# Patient Record
Sex: MALE | Race: BLACK OR AFRICAN AMERICAN | NOT HISPANIC OR LATINO | Employment: OTHER | URBAN - METROPOLITAN AREA
[De-identification: names, ages, dates, MRNs, and addresses within clinical notes are randomized per-mention and may not be internally consistent; named-entity substitution may affect disease eponyms.]

---

## 2022-02-15 ENCOUNTER — HOSPITAL ENCOUNTER (INPATIENT)
Facility: HOSPITAL | Age: 82
LOS: 1 days | Discharge: HOME HEALTH | DRG: 203 | End: 2022-02-18
Attending: EMERGENCY MEDICINE | Admitting: INTERNAL MEDICINE
Payer: MEDICARE

## 2022-02-15 ENCOUNTER — HOSPITAL ENCOUNTER (EMERGENCY)
Facility: HOSPITAL | Age: 82
Discharge: HOME | End: 2022-02-15
Attending: SURGERY
Payer: MEDICARE

## 2022-02-15 VITALS
DIASTOLIC BLOOD PRESSURE: 83 MMHG | OXYGEN SATURATION: 100 % | HEART RATE: 113 BPM | SYSTOLIC BLOOD PRESSURE: 158 MMHG | WEIGHT: 150 LBS | RESPIRATION RATE: 24 BRPM | TEMPERATURE: 96 F

## 2022-02-15 DIAGNOSIS — K22.2 ESOPHAGEAL OBSTRUCTION: ICD-10-CM

## 2022-02-15 DIAGNOSIS — T18.108A ESOPHAGEAL FOREIGN BODY: ICD-10-CM

## 2022-02-15 DIAGNOSIS — R07.9 CHEST PAIN: ICD-10-CM

## 2022-02-15 DIAGNOSIS — J39.8 TRACHEAL STENOSIS: Primary | ICD-10-CM

## 2022-02-15 PROBLEM — Z93.0 TRACHEOSTOMY IN PLACE: Status: ACTIVE | Noted: 2022-02-15

## 2022-02-15 PROBLEM — T18.128A FOOD IMPACTION OF ESOPHAGUS: Status: ACTIVE | Noted: 2022-02-15
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
## 2022-02-15 PROBLEM — C32.9 SQUAMOUS CELL CARCINOMA OF LARYNX: Status: ACTIVE | Noted: 2022-02-15

## 2022-02-15 PROBLEM — Z90.02 STATUS POST LARYNGECTOMY: Status: ACTIVE | Noted: 2022-02-15

## 2022-02-15 LAB
ALBUMIN SERPL BCP-MCNC: 4.3 G/DL (ref 3.5–5.2)
ALLENS TEST: ABNORMAL
ALP SERPL-CCNC: 109 U/L (ref 55–135)
ALT SERPL W/O P-5'-P-CCNC: 25 U/L (ref 10–44)
ANION GAP SERPL CALC-SCNC: 13 MMOL/L (ref 8–16)
AST SERPL-CCNC: 35 U/L (ref 10–40)
BASOPHILS # BLD AUTO: 0.06 K/UL (ref 0–0.2)
BASOPHILS NFR BLD: 0.9 % (ref 0–1.9)
BILIRUB SERPL-MCNC: 0.8 MG/DL (ref 0.1–1)
BUN SERPL-MCNC: 10 MG/DL (ref 8–23)
CALCIUM SERPL-MCNC: 10.9 MG/DL (ref 8.7–10.5)
CHLORIDE SERPL-SCNC: 99 MMOL/L (ref 95–110)
CK MB SERPL-MCNC: 5 NG/ML (ref 0.1–6.5)
CK MB SERPL-RTO: 6.1 % (ref 0–5)
CK SERPL-CCNC: 82 U/L (ref 20–200)
CK SERPL-CCNC: 82 U/L (ref 20–200)
CO2 SERPL-SCNC: 27 MMOL/L (ref 23–29)
CREAT SERPL-MCNC: 0.8 MG/DL (ref 0.5–1.4)
DELSYS: ABNORMAL
DIFFERENTIAL METHOD: ABNORMAL
EOSINOPHIL # BLD AUTO: 0 K/UL (ref 0–0.5)
EOSINOPHIL NFR BLD: 0.1 % (ref 0–8)
ERYTHROCYTE [DISTWIDTH] IN BLOOD BY AUTOMATED COUNT: 14 % (ref 11.5–14.5)
EST. GFR  (AFRICAN AMERICAN): >60 ML/MIN/1.73 M^2
EST. GFR  (NON AFRICAN AMERICAN): >60 ML/MIN/1.73 M^2
GLUCOSE SERPL-MCNC: 124 MG/DL (ref 70–110)
HCO3 UR-SCNC: 24.1 MMOL/L (ref 24–28)
HCT VFR BLD AUTO: 51.9 % (ref 40–54)
HGB BLD-MCNC: 17 G/DL (ref 14–18)
IMM GRANULOCYTES # BLD AUTO: 0.02 K/UL (ref 0–0.04)
IMM GRANULOCYTES NFR BLD AUTO: 0.3 % (ref 0–0.5)
LYMPHOCYTES # BLD AUTO: 1.1 K/UL (ref 1–4.8)
LYMPHOCYTES NFR BLD: 15.6 % (ref 18–48)
MCH RBC QN AUTO: 33.3 PG (ref 27–31)
MCHC RBC AUTO-ENTMCNC: 32.8 G/DL (ref 32–36)
MCV RBC AUTO: 102 FL (ref 82–98)
MODE: ABNORMAL
MONOCYTES # BLD AUTO: 0.5 K/UL (ref 0.3–1)
MONOCYTES NFR BLD: 6.7 % (ref 4–15)
NEUTROPHILS # BLD AUTO: 5.3 K/UL (ref 1.8–7.7)
NEUTROPHILS NFR BLD: 76.4 % (ref 38–73)
NRBC BLD-RTO: 0 /100 WBC
PCO2 BLDA: 42.4 MMHG (ref 35–45)
PH SMN: 7.36 [PH] (ref 7.35–7.45)
PLATELET # BLD AUTO: 217 K/UL (ref 150–450)
PMV BLD AUTO: 10.9 FL (ref 9.2–12.9)
PO2 BLDA: 30 MMHG (ref 40–60)
POC BE: -1 MMOL/L
POC SATURATED O2: 55 % (ref 95–100)
POC TCO2: 25 MMOL/L (ref 24–29)
POTASSIUM SERPL-SCNC: 4.2 MMOL/L (ref 3.5–5.1)
PROT SERPL-MCNC: 10.2 G/DL (ref 6–8.4)
RBC # BLD AUTO: 5.11 M/UL (ref 4.6–6.2)
SAMPLE: ABNORMAL
SARS-COV-2 RDRP RESP QL NAA+PROBE: NEGATIVE
SITE: ABNORMAL
SODIUM SERPL-SCNC: 139 MMOL/L (ref 136–145)
TROPONIN I SERPL DL<=0.01 NG/ML-MCNC: 0.02 NG/ML (ref 0–0.03)
WBC # BLD AUTO: 6.87 K/UL (ref 3.9–12.7)

## 2022-02-15 PROCEDURE — G0378 HOSPITAL OBSERVATION PER HR: HCPCS

## 2022-02-15 PROCEDURE — 80053 COMPREHEN METABOLIC PANEL: CPT | Performed by: SURGERY

## 2022-02-15 PROCEDURE — 96375 TX/PRO/DX INJ NEW DRUG ADDON: CPT

## 2022-02-15 PROCEDURE — 82803 BLOOD GASES ANY COMBINATION: CPT

## 2022-02-15 PROCEDURE — 99291 CRITICAL CARE FIRST HOUR: CPT | Mod: 25

## 2022-02-15 PROCEDURE — 36415 COLL VENOUS BLD VENIPUNCTURE: CPT | Performed by: SURGERY

## 2022-02-15 PROCEDURE — 96374 THER/PROPH/DIAG INJ IV PUSH: CPT

## 2022-02-15 PROCEDURE — 99900035 HC TECH TIME PER 15 MIN (STAT)

## 2022-02-15 PROCEDURE — 93010 ELECTROCARDIOGRAM REPORT: CPT | Mod: ,,, | Performed by: INTERNAL MEDICINE

## 2022-02-15 PROCEDURE — 63600175 PHARM REV CODE 636 W HCPCS: Performed by: SURGERY

## 2022-02-15 PROCEDURE — 27000221 HC OXYGEN, UP TO 24 HOURS

## 2022-02-15 PROCEDURE — U0002 COVID-19 LAB TEST NON-CDC: HCPCS | Performed by: SURGERY

## 2022-02-15 PROCEDURE — 99204 OFFICE O/P NEW MOD 45 MIN: CPT | Mod: 25,GC,, | Performed by: STUDENT IN AN ORGANIZED HEALTH CARE EDUCATION/TRAINING PROGRAM

## 2022-02-15 PROCEDURE — 99285 PR EMERGENCY DEPT VISIT,LEVEL V: ICD-10-PCS | Mod: GC,,, | Performed by: EMERGENCY MEDICINE

## 2022-02-15 PROCEDURE — 99220 PR INITIAL OBSERVATION CARE,LEVL III: ICD-10-PCS | Mod: ,,, | Performed by: PHYSICIAN ASSISTANT

## 2022-02-15 PROCEDURE — 82553 CREATINE MB FRACTION: CPT | Performed by: SURGERY

## 2022-02-15 PROCEDURE — 93005 ELECTROCARDIOGRAM TRACING: CPT

## 2022-02-15 PROCEDURE — 94640 AIRWAY INHALATION TREATMENT: CPT

## 2022-02-15 PROCEDURE — 99285 EMERGENCY DEPT VISIT HI MDM: CPT | Mod: GC,,, | Performed by: EMERGENCY MEDICINE

## 2022-02-15 PROCEDURE — 84484 ASSAY OF TROPONIN QUANT: CPT | Performed by: SURGERY

## 2022-02-15 PROCEDURE — 85025 COMPLETE CBC W/AUTO DIFF WBC: CPT | Performed by: SURGERY

## 2022-02-15 PROCEDURE — 93010 EKG 12-LEAD: ICD-10-PCS | Mod: ,,, | Performed by: INTERNAL MEDICINE

## 2022-02-15 PROCEDURE — 25000242 PHARM REV CODE 250 ALT 637 W/ HCPCS: Performed by: SURGERY

## 2022-02-15 PROCEDURE — 99220 PR INITIAL OBSERVATION CARE,LEVL III: CPT | Mod: ,,, | Performed by: PHYSICIAN ASSISTANT

## 2022-02-15 PROCEDURE — 99204 PR OFFICE/OUTPT VISIT, NEW, LEVL IV, 45-59 MIN: ICD-10-PCS | Mod: 25,GC,, | Performed by: STUDENT IN AN ORGANIZED HEALTH CARE EDUCATION/TRAINING PROGRAM

## 2022-02-15 RX ORDER — GLUCAGON 1 MG
2 KIT INJECTION
Status: COMPLETED | OUTPATIENT
Start: 2022-02-15 | End: 2022-02-15

## 2022-02-15 RX ORDER — ONDANSETRON 8 MG/1
8 TABLET, ORALLY DISINTEGRATING ORAL EVERY 8 HOURS PRN
Status: DISCONTINUED | OUTPATIENT
Start: 2022-02-15 | End: 2022-02-19 | Stop reason: HOSPADM

## 2022-02-15 RX ORDER — TALC
6 POWDER (GRAM) TOPICAL NIGHTLY PRN
Status: DISCONTINUED | OUTPATIENT
Start: 2022-02-15 | End: 2022-02-16

## 2022-02-15 RX ORDER — IPRATROPIUM BROMIDE AND ALBUTEROL SULFATE 2.5; .5 MG/3ML; MG/3ML
3 SOLUTION RESPIRATORY (INHALATION)
Status: COMPLETED | OUTPATIENT
Start: 2022-02-15 | End: 2022-02-15

## 2022-02-15 RX ORDER — MAG HYDROX/ALUMINUM HYD/SIMETH 200-200-20
30 SUSPENSION, ORAL (FINAL DOSE FORM) ORAL 4 TIMES DAILY PRN
Status: DISCONTINUED | OUTPATIENT
Start: 2022-02-15 | End: 2022-02-19 | Stop reason: HOSPADM

## 2022-02-15 RX ORDER — NALOXONE HCL 0.4 MG/ML
0.02 VIAL (ML) INJECTION
Status: DISCONTINUED | OUTPATIENT
Start: 2022-02-15 | End: 2022-02-19 | Stop reason: HOSPADM

## 2022-02-15 RX ORDER — SIMETHICONE 80 MG
1 TABLET,CHEWABLE ORAL 4 TIMES DAILY PRN
Status: DISCONTINUED | OUTPATIENT
Start: 2022-02-15 | End: 2022-02-19 | Stop reason: HOSPADM

## 2022-02-15 RX ORDER — IBUPROFEN 200 MG
24 TABLET ORAL
Status: DISCONTINUED | OUTPATIENT
Start: 2022-02-15 | End: 2022-02-19 | Stop reason: HOSPADM

## 2022-02-15 RX ORDER — PROCHLORPERAZINE EDISYLATE 5 MG/ML
5 INJECTION INTRAMUSCULAR; INTRAVENOUS EVERY 6 HOURS PRN
Status: DISCONTINUED | OUTPATIENT
Start: 2022-02-15 | End: 2022-02-19 | Stop reason: HOSPADM

## 2022-02-15 RX ORDER — IPRATROPIUM BROMIDE AND ALBUTEROL SULFATE 2.5; .5 MG/3ML; MG/3ML
3 SOLUTION RESPIRATORY (INHALATION) EVERY 4 HOURS PRN
Status: DISCONTINUED | OUTPATIENT
Start: 2022-02-15 | End: 2022-02-19 | Stop reason: HOSPADM

## 2022-02-15 RX ORDER — GLUCAGON 1 MG
KIT INJECTION
Status: COMPLETED
Start: 2022-02-15 | End: 2022-02-15

## 2022-02-15 RX ORDER — IBUPROFEN 200 MG
16 TABLET ORAL
Status: DISCONTINUED | OUTPATIENT
Start: 2022-02-15 | End: 2022-02-19 | Stop reason: HOSPADM

## 2022-02-15 RX ORDER — HYDRALAZINE HYDROCHLORIDE 20 MG/ML
20 INJECTION INTRAMUSCULAR; INTRAVENOUS
Status: COMPLETED | OUTPATIENT
Start: 2022-02-15 | End: 2022-02-15

## 2022-02-15 RX ORDER — IPRATROPIUM BROMIDE AND ALBUTEROL SULFATE 2.5; .5 MG/3ML; MG/3ML
SOLUTION RESPIRATORY (INHALATION)
Status: DISCONTINUED
Start: 2022-02-15 | End: 2022-02-15 | Stop reason: HOSPADM

## 2022-02-15 RX ORDER — GLUCAGON 1 MG
1 KIT INJECTION
Status: DISCONTINUED | OUTPATIENT
Start: 2022-02-15 | End: 2022-02-19 | Stop reason: HOSPADM

## 2022-02-15 RX ORDER — ENOXAPARIN SODIUM 100 MG/ML
40 INJECTION SUBCUTANEOUS EVERY 24 HOURS
Status: DISCONTINUED | OUTPATIENT
Start: 2022-02-16 | End: 2022-02-19 | Stop reason: HOSPADM

## 2022-02-15 RX ORDER — POLYETHYLENE GLYCOL 3350 17 G/17G
17 POWDER, FOR SOLUTION ORAL DAILY PRN
Status: DISCONTINUED | OUTPATIENT
Start: 2022-02-15 | End: 2022-02-19 | Stop reason: HOSPADM

## 2022-02-15 RX ORDER — ACETAMINOPHEN 325 MG/1
650 TABLET ORAL EVERY 4 HOURS PRN
Status: DISCONTINUED | OUTPATIENT
Start: 2022-02-15 | End: 2022-02-19 | Stop reason: HOSPADM

## 2022-02-15 RX ORDER — SODIUM CHLORIDE 0.9 % (FLUSH) 0.9 %
10 SYRINGE (ML) INJECTION EVERY 8 HOURS PRN
Status: DISCONTINUED | OUTPATIENT
Start: 2022-02-15 | End: 2022-02-19 | Stop reason: HOSPADM

## 2022-02-15 RX ADMIN — GLUCAGON 2 MG: KIT at 11:02

## 2022-02-15 RX ADMIN — HYDRALAZINE HYDROCHLORIDE 20 MG: 20 INJECTION INTRAMUSCULAR; INTRAVENOUS at 11:02

## 2022-02-15 RX ADMIN — IPRATROPIUM BROMIDE AND ALBUTEROL SULFATE 3 ML: 2.5; .5 SOLUTION RESPIRATORY (INHALATION) at 05:02

## 2022-02-15 NOTE — ED PROVIDER NOTES
[facility]                                                 I reviewed the ER triage nurse's note before evaluating the patient    Chief Complaint  81 y.o. male with Foreign Body   -- Patient to ED per [facility] with c/o eating pork chops last night  -- Feeling like it is stuck in his throat  -- No respiratory difficulty noted    History of Present Illness  Silvio Mayorga presents to the emergency room with esophageal foreign body  Patient ate pork chop last night with reported this morning per ER interview  Patient did not pass a water swallowing test in the emergency room this a.m.  Patient has no history of esophageal food boluses, stable on ER presentation    The history is provided by the patient  Previous medical records were obtained from [facility]  Previous records are summarized from prior ER visits and hospitalizations  History reviewed. No pertinent past medical history.  History reviewed. No pertinent surgical history.   No Known Allergies   No significant family history  No significant social history, nonsmoker    I have reviewed all of this patient's past medical, surgical, family, and social   histories as well as active allergies and medications documented in the  electronic medical record    Review of Systems and Physical Exam      Review of Systems (all other ROS are otherwise negative)  -- Constitution - no fever, denies fatigue, no weakness, no chills, night sweats  -- Eyes - no tearing or redness, no visual disturbance  -- Ear, Nose - no tinnitus or earache, no nasal congestion or discharge  -- Mouth,Throat - no sore throat, no toothache, normal voice, normal swallowing  -- Respiratory - denies cough and congestion, no shortness of breath, no LOO  -- Cardiovascular - denies chest pain, no palpitations, denies claudication  -- Gastrointestinal - feels like he has a impacted food bolus in his esophagus  -- Genitourinary - no dysuria, no hematuria, no flank pain, no  bladder pain  -- Musculoskeletal - denies back pain, negative for trauma or injury  -- Neurological - no headache, no numbness, tingling, seizure, balance issues  -- Hematologic- no bruising, no bleeding, nose bleed, bleeding disorders    Vital Signs (reviewed by the physician)  His oral temperature is 96.4 °F (35.8 °C).   His blood pressure is 133/63 and his pulse is 103.   His respiration is 20 and oxygen saturation is 100%.     Physical Exam  -- Nursing note and vitals reviewed  -- Constitutional: Appears well-developed and well-nourished  -- Head: Atraumatic. Normocephalic. No obvious abnormality  -- Eyes: Pupils are equal and reactive to light. Normal conjunctiva and lids  -- Nose: Nose normal in appearance, nares grossly normal. No discharge  -- Throat: Mucous membranes moist, pharynx normal, normal tonsils. No lesions   -- Ears: External ears and TM normal bilaterally. Normal hearing and no drainage  -- Neck: Tracheostomy is clean dry and intact  -- Cardiac: Normal rate, regular rhythm and normal heart sounds  -- Respiratory: Normal respiratory effort, breath sounds clear to auscultation  -- Gastrointestinal: Soft, no tenderness. Normal bowel sounds. Normal liver edge  -- Musculoskeletal: Normal range of motion, no effusions. Joints stable   -- Neurological: No focal deficits. Showed good interaction with staff  -- Vascular: Posterior tibial, dorsalis pedis and radial pulses 2+ bilaterally      Emergency Room Course      Lab Results (reviewed by the physician)     K 4.2   CL 99   CO2 27   BUN 10   CREATININE 0.8    (H)   ALKPHOS 109   AST 35   ALT 25   BILITOT 0.8   ALBUMIN 4.3   PROT 10.2 (H)   WBC 6.87   HGB 17.0   HCT 51.9      CPK 82   CPK 82   CPKMB 5.0   TROPONINI 0.021     EKG (interpreted by the physician)  Overall Interpretation: normal rate and rhythm with no obvious ischemic changes  Normal sinus rhythm @ 103 bpm  No ST elevation or depression  No arrhythmia or QRS change  noted  Similar when compared to previous EKG    Chest x-ray (images visualized & reports reviewed by the physician)  The lungs are well expanded with coarse interstitial markings throughout.    Questionable reticular nodularity in the right lung apex which could represent a   superimposed acute inflammatory/infectious process.  Heart size is normal.    Calcified atheromatous disease affects the aorta. Age-appropriate degenerative   changes affect the skeleton.     Additional Work up (reviewed by the physician)  -- rapid Coronavirus PCR was negative    Medications Given  hydrALAZINE injection 20 mg (20 mg Intravenous Given 2/15/22 1153)   glucagon (human recombinant) injection 2 mg     Critical Care ED Physician Time (minutes):  -- Performed by: Jared Perkins M.D.  -- Date/Time: 1:22 PM 2/15/2022   -- Direct Patient Care (Face Time): 5  -- Additional History from Records or Taking Additional History: 5  -- Ordering, Reviewing, and Interpreting Diagnostic Studies: 5  -- Total Time in Documentation: 11  -- Consultation with Other Physicians: 5  -- Consultation with Family Related to Condition: 0  -- Total Critical Care Time: 31  -- Critical care was necessary to treat severe hypertension treated with IV hydralazine  -- Critical care was time spent personally by me on the following activities:   -- blood draw for specimens discussions with consultants,   -- development of treatment plan with patient or surrogate,   -- examination of patient, ordering and performing treatments   -- review of radiographic studies, re-evaluation of pt's condition  -- review of labs and evaluation of response to treatment    Medical Decision Making     ED Course/ED Management  -- patient presents with trouble swallowing after eating pork chops last night  -- likely esophageal food bolus impaction, glucagon did not help the issue  -- will transfer for Gastroenterology evaluation, stable at this time  -- has not been to the doctor in several  years with significant hypertension treated   -- appropriate in stable at this time, agrees to transfer to higher level care    Assessment, Disposition, & Plan      Diagnosis  [T18.108A] Esophageal foreign body    Disposition and Plan  -- Disposition: transfer  -- Condition: stable    This note is dictated on M*Modal word recognition program.  There are word recognition mistakes that are occasionally missed on review.         Jared Perkins MD  02/15/22 9622

## 2022-02-16 ENCOUNTER — ANESTHESIA (OUTPATIENT)
Dept: ENDOSCOPY | Facility: HOSPITAL | Age: 82
DRG: 203 | End: 2022-02-16
Payer: MEDICARE

## 2022-02-16 ENCOUNTER — ANESTHESIA EVENT (OUTPATIENT)
Dept: ENDOSCOPY | Facility: HOSPITAL | Age: 82
DRG: 203 | End: 2022-02-16
Payer: MEDICARE

## 2022-02-16 PROBLEM — Z93.0 TRACHEOSTOMY IN PLACE: Status: RESOLVED | Noted: 2022-02-15 | Resolved: 2022-02-16

## 2022-02-16 PROBLEM — F10.10 ALCOHOL ABUSE: Status: ACTIVE | Noted: 2022-02-16

## 2022-02-16 LAB
ANION GAP SERPL CALC-SCNC: 16 MMOL/L (ref 8–16)
BASOPHILS # BLD AUTO: 0.05 K/UL (ref 0–0.2)
BASOPHILS NFR BLD: 0.7 % (ref 0–1.9)
BUN SERPL-MCNC: 14 MG/DL (ref 8–23)
CALCIUM SERPL-MCNC: 10.3 MG/DL (ref 8.7–10.5)
CHLORIDE SERPL-SCNC: 102 MMOL/L (ref 95–110)
CO2 SERPL-SCNC: 21 MMOL/L (ref 23–29)
CREAT SERPL-MCNC: 0.8 MG/DL (ref 0.5–1.4)
DIFFERENTIAL METHOD: ABNORMAL
EOSINOPHIL # BLD AUTO: 0 K/UL (ref 0–0.5)
EOSINOPHIL NFR BLD: 0 % (ref 0–8)
ERYTHROCYTE [DISTWIDTH] IN BLOOD BY AUTOMATED COUNT: 14.3 % (ref 11.5–14.5)
EST. GFR  (AFRICAN AMERICAN): >60 ML/MIN/1.73 M^2
EST. GFR  (NON AFRICAN AMERICAN): >60 ML/MIN/1.73 M^2
GLUCOSE SERPL-MCNC: 100 MG/DL (ref 70–110)
HCT VFR BLD AUTO: 47.6 % (ref 40–54)
HGB BLD-MCNC: 15.3 G/DL (ref 14–18)
IMM GRANULOCYTES # BLD AUTO: 0.02 K/UL (ref 0–0.04)
IMM GRANULOCYTES NFR BLD AUTO: 0.3 % (ref 0–0.5)
LYMPHOCYTES # BLD AUTO: 1.3 K/UL (ref 1–4.8)
LYMPHOCYTES NFR BLD: 17.1 % (ref 18–48)
MCH RBC QN AUTO: 33.3 PG (ref 27–31)
MCHC RBC AUTO-ENTMCNC: 32.1 G/DL (ref 32–36)
MCV RBC AUTO: 104 FL (ref 82–98)
MONOCYTES # BLD AUTO: 0.6 K/UL (ref 0.3–1)
MONOCYTES NFR BLD: 7.5 % (ref 4–15)
NEUTROPHILS # BLD AUTO: 5.7 K/UL (ref 1.8–7.7)
NEUTROPHILS NFR BLD: 74.4 % (ref 38–73)
NRBC BLD-RTO: 0 /100 WBC
PLATELET # BLD AUTO: 210 K/UL (ref 150–450)
PMV BLD AUTO: 10.4 FL (ref 9.2–12.9)
POTASSIUM SERPL-SCNC: 5 MMOL/L (ref 3.5–5.1)
RBC # BLD AUTO: 4.6 M/UL (ref 4.6–6.2)
SODIUM SERPL-SCNC: 139 MMOL/L (ref 136–145)
WBC # BLD AUTO: 7.62 K/UL (ref 3.9–12.7)

## 2022-02-16 PROCEDURE — G0378 HOSPITAL OBSERVATION PER HR: HCPCS

## 2022-02-16 PROCEDURE — 63600175 PHARM REV CODE 636 W HCPCS: Performed by: PHYSICIAN ASSISTANT

## 2022-02-16 PROCEDURE — 25000003 PHARM REV CODE 250: Performed by: PHYSICIAN ASSISTANT

## 2022-02-16 PROCEDURE — 94761 N-INVAS EAR/PLS OXIMETRY MLT: CPT

## 2022-02-16 PROCEDURE — 96375 TX/PRO/DX INJ NEW DRUG ADDON: CPT | Performed by: EMERGENCY MEDICINE

## 2022-02-16 PROCEDURE — 96374 THER/PROPH/DIAG INJ IV PUSH: CPT | Performed by: EMERGENCY MEDICINE

## 2022-02-16 PROCEDURE — 99226 PR SUBSEQUENT OBSERVATION CARE,LEVEL III: ICD-10-PCS | Mod: ,,, | Performed by: PHYSICIAN ASSISTANT

## 2022-02-16 PROCEDURE — 43235 EGD DIAGNOSTIC BRUSH WASH: CPT | Mod: GC,,, | Performed by: STUDENT IN AN ORGANIZED HEALTH CARE EDUCATION/TRAINING PROGRAM

## 2022-02-16 PROCEDURE — 27000221 HC OXYGEN, UP TO 24 HOURS

## 2022-02-16 PROCEDURE — 99900035 HC TECH TIME PER 15 MIN (STAT)

## 2022-02-16 PROCEDURE — 63600175 PHARM REV CODE 636 W HCPCS: Performed by: NURSE ANESTHETIST, CERTIFIED REGISTERED

## 2022-02-16 PROCEDURE — 63600175 PHARM REV CODE 636 W HCPCS: Performed by: INTERNAL MEDICINE

## 2022-02-16 PROCEDURE — D9220A PRA ANESTHESIA: Mod: ANES,,, | Performed by: ANESTHESIOLOGY

## 2022-02-16 PROCEDURE — 25000003 PHARM REV CODE 250: Performed by: NURSE ANESTHETIST, CERTIFIED REGISTERED

## 2022-02-16 PROCEDURE — 96372 THER/PROPH/DIAG INJ SC/IM: CPT | Performed by: PHYSICIAN ASSISTANT

## 2022-02-16 PROCEDURE — D9220A PRA ANESTHESIA: ICD-10-PCS | Mod: ANES,,, | Performed by: ANESTHESIOLOGY

## 2022-02-16 PROCEDURE — 43235 PR EGD, FLEX, DIAGNOSTIC: ICD-10-PCS | Mod: GC,,, | Performed by: STUDENT IN AN ORGANIZED HEALTH CARE EDUCATION/TRAINING PROGRAM

## 2022-02-16 PROCEDURE — D9220A PRA ANESTHESIA: ICD-10-PCS | Mod: CRNA,,, | Performed by: NURSE ANESTHETIST, CERTIFIED REGISTERED

## 2022-02-16 PROCEDURE — 96361 HYDRATE IV INFUSION ADD-ON: CPT | Performed by: EMERGENCY MEDICINE

## 2022-02-16 PROCEDURE — 37000008 HC ANESTHESIA 1ST 15 MINUTES: Performed by: STUDENT IN AN ORGANIZED HEALTH CARE EDUCATION/TRAINING PROGRAM

## 2022-02-16 PROCEDURE — 80048 BASIC METABOLIC PNL TOTAL CA: CPT | Performed by: PHYSICIAN ASSISTANT

## 2022-02-16 PROCEDURE — 37000009 HC ANESTHESIA EA ADD 15 MINS: Performed by: STUDENT IN AN ORGANIZED HEALTH CARE EDUCATION/TRAINING PROGRAM

## 2022-02-16 PROCEDURE — 99226 PR SUBSEQUENT OBSERVATION CARE,LEVEL III: CPT | Mod: ,,, | Performed by: PHYSICIAN ASSISTANT

## 2022-02-16 PROCEDURE — 43235 EGD DIAGNOSTIC BRUSH WASH: CPT | Performed by: STUDENT IN AN ORGANIZED HEALTH CARE EDUCATION/TRAINING PROGRAM

## 2022-02-16 PROCEDURE — D9220A PRA ANESTHESIA: Mod: CRNA,,, | Performed by: NURSE ANESTHETIST, CERTIFIED REGISTERED

## 2022-02-16 PROCEDURE — 85025 COMPLETE CBC W/AUTO DIFF WBC: CPT | Performed by: PHYSICIAN ASSISTANT

## 2022-02-16 RX ORDER — PROPOFOL 10 MG/ML
VIAL (ML) INTRAVENOUS
Status: DISCONTINUED | OUTPATIENT
Start: 2022-02-16 | End: 2022-02-16

## 2022-02-16 RX ORDER — HYDRALAZINE HYDROCHLORIDE 20 MG/ML
10 INJECTION INTRAMUSCULAR; INTRAVENOUS ONCE
Status: COMPLETED | OUTPATIENT
Start: 2022-02-16 | End: 2022-02-16

## 2022-02-16 RX ORDER — LORAZEPAM 2 MG/ML
0.5 INJECTION INTRAMUSCULAR ONCE AS NEEDED
Status: COMPLETED | OUTPATIENT
Start: 2022-02-16 | End: 2022-02-16

## 2022-02-16 RX ORDER — PROPOFOL 10 MG/ML
VIAL (ML) INTRAVENOUS CONTINUOUS PRN
Status: DISCONTINUED | OUTPATIENT
Start: 2022-02-16 | End: 2022-02-16

## 2022-02-16 RX ORDER — SODIUM CHLORIDE 0.9 % (FLUSH) 0.9 %
10 SYRINGE (ML) INJECTION
Status: DISCONTINUED | OUTPATIENT
Start: 2022-02-16 | End: 2022-02-19 | Stop reason: HOSPADM

## 2022-02-16 RX ORDER — TALC
6 POWDER (GRAM) TOPICAL NIGHTLY
Status: DISCONTINUED | OUTPATIENT
Start: 2022-02-16 | End: 2022-02-19 | Stop reason: HOSPADM

## 2022-02-16 RX ORDER — LIDOCAINE HYDROCHLORIDE 20 MG/ML
INJECTION INTRAVENOUS
Status: DISCONTINUED | OUTPATIENT
Start: 2022-02-16 | End: 2022-02-16

## 2022-02-16 RX ORDER — HYDRALAZINE HYDROCHLORIDE 20 MG/ML
INJECTION INTRAMUSCULAR; INTRAVENOUS
Status: DISPENSED
Start: 2022-02-16 | End: 2022-02-17

## 2022-02-16 RX ADMIN — SODIUM CHLORIDE: 0.9 INJECTION, SOLUTION INTRAVENOUS at 09:02

## 2022-02-16 RX ADMIN — LORAZEPAM 0.5 MG: 2 INJECTION INTRAMUSCULAR; INTRAVENOUS at 05:02

## 2022-02-16 RX ADMIN — SODIUM CHLORIDE, SODIUM LACTATE, POTASSIUM CHLORIDE, AND CALCIUM CHLORIDE 500 ML: .6; .31; .03; .02 INJECTION, SOLUTION INTRAVENOUS at 04:02

## 2022-02-16 RX ADMIN — ENOXAPARIN SODIUM 40 MG: 100 INJECTION SUBCUTANEOUS at 04:02

## 2022-02-16 RX ADMIN — LIDOCAINE HYDROCHLORIDE 100 MG: 20 INJECTION, SOLUTION INTRAVENOUS at 09:02

## 2022-02-16 RX ADMIN — Medication 40 MG: at 09:02

## 2022-02-16 RX ADMIN — HYDRALAZINE HYDROCHLORIDE 10 MG: 20 INJECTION INTRAMUSCULAR; INTRAVENOUS at 12:02

## 2022-02-16 RX ADMIN — Medication 6 MG: at 12:02

## 2022-02-16 RX ADMIN — PROPOFOL 150 MCG/KG/MIN: 10 INJECTION, EMULSION INTRAVENOUS at 09:02

## 2022-02-16 NOTE — HOSPITAL COURSE
81 y.o male admitted for GI evaluation for possible food impaction. Maintained airway and tolerated secretions and water. EGD done 12/16 showing no food bolus or evidence of recent impaction seen in the entire esophagus. Gastritis present and normal examined duodenum. Was cleared by GI for discharge home on chopped-up diet but had frequent oxygen saturations in 70-80's. Improved to 90s with 35% trach collar. RT evaluated, difficulty with suctioning tracheostomy site. ENT was consulted and found dry crust occupying about 90% of the stoma, which was cleared with improvement of his respiratory status. Patient to discharge home with HH services when medically stable. Pt also given ambulatory ENT and primary care referrals. SLP educated Pt on proper laryngectomy care. Pt educated on hospital course and plan, verbally agrees and understands. All questions answered.

## 2022-02-16 NOTE — ASSESSMENT & PLAN NOTE
81 y.o. male with history of history of laryngeal cancer s/p total laryngectomy (2008) and tracheostomy placement (no adjuvant therapy) who presents with feeling of food impaction in esophagus. Had food impaction approx 2 years ago that did not require EGD, he was able to cough it up.      Suspect obstruction of stoma causing symptoms, not food obstruction, see below   - in ED able to tolerate PO liquid and secretions. Maintaining airway  - s/p glucagon at outside ED  - GI consulted, appreciate recs  - EDG 2/16 without impacted food seen in the entirety of the esophagus. Gastritis was present, duodenum normal.   - ENT consulted. Patient still with FB sensation. Oxygen desaturations. Requiring trach collar with 35% O2 to maintain O2 saturation. Patient has history of removing trach collar.

## 2022-02-16 NOTE — PROVATION PATIENT INSTRUCTIONS
Discharge Summary/Instructions after an Endoscopic Procedure  Patient Name: Silvio Mayorga  Patient MRN: [id number]  Patient YOB: 1940 Wednesday, February 16, 2022  Heber Miller MD  Dear patient,  As a result of recent federal legislation (The Federal Cures Act), you may   receive lab or pathology results from your procedure in your [facility]   account before your physician is able to contact you. Your physician or   their representative will relay the results to you with their   recommendations at their soonest availability.  Thank you,  RESTRICTIONS:  During your procedure today, you received medications for sedation.  These   medications may affect your judgment, balance and coordination.  Therefore,   for 24 hours, you have the following restrictions:   - DO NOT drive a car, operate machinery, make legal/financial decisions,   sign important papers or drink alcohol.    ACTIVITY:  Today: no heavy lifting, straining or running due to procedural   sedation/anesthesia.  The following day: return to full activity including work.  DIET:  Eat and drink normally unless instructed otherwise.     TREATMENT FOR COMMON SIDE EFFECTS:  - Mild abdominal pain, nausea, belching, bloating or excessive gas:  rest,   eat lightly and use a heating pad.  - Sore Throat: treat with throat lozenges and/or gargle with warm salt   water.  - Because air was used during the procedure, expelling large amounts of air   from your rectum or belching is normal.  - If a bowel prep was taken, you may not have a bowel movement for 1-3 days.    This is normal.  SYMPTOMS TO WATCH FOR AND REPORT TO YOUR PHYSICIAN:  1. Abdominal pain or bloating, other than gas cramps.  2. Chest pain.  3. Back pain.  4. Signs of infection such as: chills or fever occurring within 24 hours   after the procedure.  5. Rectal bleeding, which would show as bright red, maroon, or black stools.   (A tablespoon of blood from the rectum is not serious,  especially if   hemorrhoids are present.)  6. Vomiting.  7. Weakness or dizziness.  GO DIRECTLY TO THE NEAREST EMERGENCY ROOM IF YOU HAVE ANY OF THE FOLLOWING:      Difficulty breathing              Chills and/or fever over 101 F   Persistent vomiting and/or vomiting blood   Severe abdominal pain   Severe chest pain   Black, tarry stools   Bleeding- more than one tablespoon   Any other symptom or condition that you feel may need urgent attention  Your doctor recommends these additional instructions:  If any biopsies were taken, your doctors clinic will contact you in 1 to 2   weeks with any results.  - Discharge patient to home.   - Chopped diet.   - Continue present medications.   - The findings and recommendations were discussed with the patient's   family.  For questions, problems or results please call your physician - Heber Miller MD at Work:  [phone].  [facility], EMERGENCY ROOM PHONE NUMBER: [phone]  IF A COMPLICATION OR EMERGENCY SITUATION ARISES AND YOU ARE UNABLE TO REACH   YOUR PHYSICIAN - GO DIRECTLY TO THE EMERGENCY ROOM.  Heber Miller MD  2/16/2022 9:25:21 AM  This report has been verified and signed electronically.  Dear patient,  As a result of recent federal legislation (The Federal Cures Act), you may   receive lab or pathology results from your procedure in your [facility]   account before your physician is able to contact you. Your physician or   their representative will relay the results to you with their   recommendations at their soonest availability.  Thank you,  PROVATION

## 2022-02-16 NOTE — ED PROVIDER NOTES
Encounter Date: 2/15/2022       History     Chief Complaint   Patient presents with    food bolus      Pt reports to ED via EMS from [facility] transfer  food bolus in esophagus, pt has old tracheostomy, 35% trach collar, 100%, Glucagon given,. Per EMS prior to leaving facility audible stridor present after breathing treatment. Pt aaox4      81-year-old male with history of food boluses, status post laryngectomy presents via EMS as transfer some from [facility] for Gastroenterology eval for food bolus after eating pork chops last night.  Patient denies any difficulty handling his secretions. Patient does feel like he needs to cough something up, which is typical for him and usually response to saline flush. Patient received glucagon at outside facility which improved his symptoms.  Patient is able to swallow his saliva and drink water without any issues currently.  Patient denies any abdominal pain, nausea/vomiting, chest pain, shortness of breath    The history is provided by the patient and medical records.     Review of patient's allergies indicates:  No Known Allergies  Past Medical History:   Diagnosis Date    Cancer     Squamous cell carcinoma of larynx      Past Surgical History:   Procedure Laterality Date    laryngectomy      TRACHEOSTOMY       History reviewed. No pertinent family history.  Social History     Tobacco Use    Smoking status: Never Smoker    Smokeless tobacco: Never Used   Substance Use Topics    Alcohol use: Yes    Drug use: Not Currently     Review of Systems   Constitutional: Negative for fatigue and fever.   HENT: Negative for rhinorrhea and sore throat.    Eyes: Negative for discharge and redness.   Respiratory: Positive for cough and choking.    Cardiovascular: Negative for chest pain and palpitations.   Gastrointestinal: Negative for diarrhea, nausea and vomiting.   Endocrine: Negative for cold intolerance and heat intolerance.   Genitourinary: Negative for dysuria and frequency.    Musculoskeletal: Negative for myalgias and neck stiffness.   Skin: Negative for pallor and rash.   Neurological: Negative for dizziness and headaches.   Psychiatric/Behavioral: Negative for agitation and confusion.       Physical Exam     Initial Vitals   BP Pulse Resp Temp SpO2   02/15/22 1836 02/15/22 1836 02/15/22 1836 02/15/22 1837 02/15/22 1836   (!) 154/102 90 (!) 21 98.8 °F (37.1 °C) (!) 94 %      MAP       --                Physical Exam    Nursing note and vitals reviewed.  Constitutional:   Alert, unable to verbalize but able to mouth words, handling secretions   HENT:   Head: Normocephalic and atraumatic.   Mouth/Throat: Oropharynx is clear and moist.   Handling secretions, able swallow without difficulty   Eyes: Conjunctivae are normal. No scleral icterus.   Neck: Neck supple.   Laryngectomy stoma site without drainage. No trach tube in place   Cardiovascular: Normal rate, regular rhythm, normal heart sounds and intact distal pulses.   Pulmonary/Chest: Breath sounds normal. No stridor. No respiratory distress.   Abdominal: Abdomen is soft. He exhibits no distension. There is no abdominal tenderness.   Musculoskeletal:         General: No tenderness or edema.      Cervical back: Neck supple.     Neurological: He is alert and oriented to person, place, and time.   Skin: Skin is warm and dry. Capillary refill takes less than 2 seconds. No rash noted.   Psychiatric: He has a normal mood and affect. Thought content normal.         ED Course   Procedures  Labs Reviewed   BASIC METABOLIC PANEL - Abnormal; Notable for the following components:       Result Value    CO2 21 (*)     All other components within normal limits   CBC W/ AUTO DIFFERENTIAL - Abnormal; Notable for the following components:     (*)     MCH 33.3 (*)     Gran % 74.4 (*)     Lymph % 17.1 (*)     All other components within normal limits   ISTAT PROCEDURE - Abnormal; Notable for the following components:    POC PO2 30 (*)     POC  SATURATED O2 55 (*)     All other components within normal limits          Imaging Results    None          Medications   sodium chloride 0.9% flush 10 mL (has no administration in time range)   acetaminophen tablet 650 mg (has no administration in time range)   polyethylene glycol packet 17 g (has no administration in time range)   ondansetron disintegrating tablet 8 mg (has no administration in time range)   prochlorperazine injection Soln 5 mg (has no administration in time range)   glucose chewable tablet 16 g (has no administration in time range)   glucose chewable tablet 24 g (has no administration in time range)   glucagon (human recombinant) injection 1 mg (has no administration in time range)   albuterol-ipratropium 2.5 mg-0.5 mg/3 mL nebulizer solution 3 mL (has no administration in time range)   simethicone chewable tablet 80 mg (has no administration in time range)   aluminum-magnesium hydroxide-simethicone 200-200-20 mg/5 mL suspension 30 mL (has no administration in time range)   naloxone 0.4 mg/mL injection 0.02 mg (has no administration in time range)   enoxaparin injection 40 mg (has no administration in time range)   dextrose 10% bolus 125 mL (has no administration in time range)   dextrose 10% bolus 250 mL (has no administration in time range)   melatonin tablet 6 mg (6 mg Oral Given 2/16/22 0026)   sodium chloride 0.9% flush 10 mL (has no administration in time range)   lactated ringers bolus 500 mL (has no administration in time range)   hydrALAZINE (APRESOLINE) 20 mg/mL injection (  Not Given 2/16/22 1245)   lorazepam injection 0.5 mg (0.5 mg Intravenous Given 2/16/22 0500)   hydrALAZINE injection 10 mg (10 mg Intravenous Given 2/16/22 1236)     Medical Decision Making:   History:   I obtained history from: EMS provider.  Old Medical Records: I decided to obtain old medical records.  Old Records Summarized: records from another hospital.  Initial Assessment:   81-year-old male with history of  food boluses, status post tracheostomy presents via EMS as transfer some from [facility] for esophageal food bolus  Clinical Tests:   Lab Tests: Ordered and Reviewed  Radiological Study: Reviewed  ED Management:  Patient presents with laryngectomy site patent, patient alert alert, able to clear secretions and cough, no respiratory distress or hypoxia  Patient has history of similar, describe symptoms came on after eating pork chops last night.  Chest x-ray at outside facility shows mild opacification of the right apex lung, possibly infectious process although less likely as patient is nontoxic appearing and denies any trouble breathing orbesides his baseline cough            Attending Attestation:   Physician Attestation Statement for Resident:  As the supervising MD   Physician Attestation Statement: I have personally seen and examined this patient.   I agree with the above history. -:   As the supervising MD I agree with the above PE.    As the supervising MD I agree with the above treatment, course, plan, and disposition.                 I have reviewed and concur with the resident's history, physical, assessment, and plan.  I have personally interviewed and examined the patient at bedside.   I did supervise any and all procedures and was present for any critical portion, and was always immediately available for help and as a resource.     Complexity: High - level 5    Final diagnoses:  [K22.2] Esophageal obstruction     Gumaro Martinez DO, FAAEM  Emergency Staff Physician   Dept of Emergency Medicine   [facility]  Spectralink: [id number]        Disclaimer: This note has been generated using voice-recognition software. There may be typographical errors that have been missed during proof-reading.              ED Course as of 02/16/22 1251   Tue Feb 15, 2022   2104 SpO2: 100 % [OK]      ED Course User Index  [OK] Dawit Doyle MD             Clinical Impression:   Final diagnoses:  [K22.2] Esophageal  obstruction          ED Disposition Condition    Observation               Dawit Doyle MD  Resident  02/15/22 5620       Gumaro Martinez DO  02/16/22 [id number]

## 2022-02-16 NOTE — ASSESSMENT & PLAN NOTE
s/p laryngectomy  tracheostomy in place    s/p laryngectomy in 2008, no adjuvant tx. Last follow up in 2013, no further surveillance.    - respiratory failure noted following EGD, likely secondary to stoma obstruction   - ENT consulted   - Large crust removed from stoma with instant improvement in breathing  - 4-0 cuffless Shiley trach placed into stoma with moderate resistance. Will attempt to upsize to 6-0 and eventually place laryngectomy tube. Pt may ultimately need stoma revision.   - Recommend humidified O2  - Recommend saline bullets with RT q4h while awake  - Recommend frequent gentle suctioning with flexible suction catheters  - Recommend SLP consultation  - Please keep 4-0 and 6-0 inner cannulas at bedside  - Please bring 6-0 cuffless Shiley to bedside (may need to request from RT)  - Obturator taped to headboard  - Pt is a neck breather, and can only be intubated through stoma in case of emergency

## 2022-02-16 NOTE — ASSESSMENT & PLAN NOTE
Held benzo as not to decrease respiratory drive, now ok to give if needed   - IVFs for volume support  - CIWA  - aspiration precautions   - telemetry   - neuro checks, vital signs Q4H

## 2022-02-16 NOTE — ANESTHESIA PREPROCEDURE EVALUATION
2022  Pre-operative evaluation for Procedure(s) (LRB):  EGD (ESOPHAGOGASTRODUODENOSCOPY) (N/A)    Silvio Mayorga is a 81 y.o. male with a PMHx of laryngeal cancer s/p total laryngectomy () and tracheostomy placement (no adjuvant therapy) who presented to the ED with the feeling of a food impaction in esophagus.       Patient Active Problem List   Diagnosis    Food impaction of esophagus    Status post laryngectomy    Squamous cell carcinoma of larynx    Tracheostomy in place       Review of patient's allergies indicates:  No Known Allergies    No current facility-administered medications on file prior to encounter.     No current outpatient medications on file prior to encounter.       Past Surgical History:   Procedure Laterality Date    laryngectomy      TRACHEOSTOMY         Social History     Socioeconomic History    Marital status:    Tobacco Use    Smoking status: Never Smoker    Smokeless tobacco: Never Used   Substance and Sexual Activity    Alcohol use: Yes    Drug use: Not Currently    Sexual activity: Not Currently         CBC:   Recent Labs     02/15/22  1156 22  0003   WBC 6.87 7.62   RBC 5.11 4.60   HGB 17.0 15.3   HCT 51.9 47.6    210   * 104*   MCH 33.3* 33.3*   MCHC 32.8 32.1       CMP:   Recent Labs     02/15/22  1156 22  0003    139   K 4.2 5.0   CL 99 102   CO2 27 21*   BUN 10 14   CREATININE 0.8 0.8   * 100   CALCIUM 10.9* 10.3   ALBUMIN 4.3  --    PROT 10.2*  --    ALKPHOS 109  --    ALT 25  --    AST 35  --    BILITOT 0.8  --        INR  No results for input(s): PT, INR, PROTIME, APTT in the last 72 hours.        Diagnostic Studies:      EKD Echo:  No results found for this or any previous visit.      Anesthesia Evaluation    I have reviewed the Patient Summary Reports.    I have reviewed the Nursing Notes. I  have reviewed the NPO Status.      Review of Systems  Social:  Alcohol Use Per nursing report from floor, being given ativan for DT   Hematology/Oncology:         -- Cancer in past history (laryngeal, s/p total laryngectomy with trach in place):   EENT/Dental:   Throat Disease: Cancer of, larynx        Physical Exam  General:  Well nourished    Airway/Jaw/Neck:  Airway Findings: Pre-Existing Airway Tube(s): Tracheal Stoma        Dental:  DENTAL FINDINGS: Normal   Chest/Lungs:  Chest/Lungs Clear    Heart/Vascular:  Heart Findings: Normal    Abdomen:  Abdomen Findings: Normal    Musculoskeletal:  Musculoskeletal Findings: Normal    Mental Status:  Mental Status Findings:  Lethargic, Somnolent         Anesthesia Plan  Type of Anesthesia, risks & benefits discussed:  Anesthesia Type:  general    Patient's Preference:   Plan Factors:          Intra-op Monitoring Plan: standard ASA monitors  Intra-op Monitoring Plan Comments:   Post Op Pain Control Plan: multimodal analgesia  Post Op Pain Control Plan Comments:     Induction:   IV  Beta Blocker:         Informed Consent: Patient representative understands risks and agrees with Anesthesia plan.  Questions answered. Anesthesia consent signed with patient representative.  ASA Score: 3     Day of Surgery Review of History & Physical:    H&P update referred to the provider.         Ready For Surgery From Anesthesia Perspective.

## 2022-02-16 NOTE — HPI
81M w PMHx of laryngeal SCC s/p total laryngectomy (2008) without chemo+RT who presented to ED 2/15/22 w feeling of a food impaction in esophagus x1 week. He went to the ED at [facility] and was transferred here for GI eval. EGD without evidence of impaction or abnormality. He is able to tolerate liquids, was witnessed drinking water in ED. He is swallowing his saliva. Noted to have difficulty breathing and intermittent O2 desaturations into the 80s. ENT consulted for evaluation of laryngectomy stoma and dyspnea.     Upon presentation, pt does not have any tube in his stoma. Aphonic but communicates by mouthing words and shaking head in affirmative/negative. States difficult breathing is new. No new HN lumps or bumps. Does not have ENT follow up. Saw Dr. Byrd in 2013 who was concerned for LN in neck, ordered FNA but was never performed. Has not had follow up since.

## 2022-02-16 NOTE — PROGRESS NOTES
GI Post Procedure Treatment Plan    Interval history:  EGD completed.   - Normal esophagus. There was no food bolus or evidence of recent impaction seen in the entire esophagus.   - Gastritis.   - Normal examined duodenum.     Plan:  - Discharge patient to home.   - Chopped diet.   - Continue present medications.   - we will sign off, please call with questions.    Marisela Rizo MD  GI Fellow, PGY-5

## 2022-02-16 NOTE — HPI
Silvio Mayorga is a 81 y.o. male with a PMHx of laryngeal cancer s/p total laryngectomy (2008) and tracheostomy placement (no adjuvant therapy) who presented to the ED with the feeling of a food impaction in esophagus. He swallowed a piece of pork last night when he felt it get stuck. This morning he was able to cough up a small piece of the food, but it still feels like something is in his throat. He went to the ED at [facility] and was transferred here for GI eval. Approximately 2 years ago he had a food impaction but was able to cough it up and has never required endoscopy for removal. He is able to tolerate liquids, was witnessed drinking water in ED. He is swallowing his saliva. No airway compromise and he denies SOB, abdominal pain, N/V, and chest pain.      He received glucagon at outside facility. Patient was able to swallow half a cup of water in ED and felt it go down.     ED: Afebrile, /102, , on trach collar. CBC, CMP without acute abnormalities. CXR without acute process.

## 2022-02-16 NOTE — CONSULTS
[facility] - Tele[facility]down ([place] [address])  Otorhinolaryngology-Head & Neck Surgery  Consult Note    Patient Name: Silvio Mayorga  MRN: [id number]  Code Status: Full Code  Admission Date: 2/15/2022  Hospital Length of Stay: 0 days  Attending Physician: Dia Castro MD  Primary Care Provider: Primary Doctor No    Patient information was obtained from patient, parent and primary team.     Inpatient consult to ENT  Consult performed by: Rao Crowell MD  Consult ordered by: Mayank Bacon PA-C        Subjective:     Chief Complaint/Reason for Admission: food impaction    History of Present Illness: 81M w PMHx of laryngeal SCC s/p total laryngectomy (2008) without chemo+RT who presented to ED 2/15/22 w feeling of a food impaction in esophagus x1 week. He went to the ED at [facility] and was transferred here for GI eval. EGD without evidence of impaction or abnormality. He is able to tolerate liquids, was witnessed drinking water in ED. He is swallowing his saliva. Noted to have difficulty breathing and intermittent O2 desaturations into the 80s. ENT consulted for evaluation of laryngectomy stoma and dyspnea.     Upon presentation, pt does not have any tube in his stoma. Aphonic but communicates by mouthing words and shaking head in affirmative/negative. States difficult breathing is new. No new HN lumps or bumps. Does not have ENT follow up. Saw Dr. Byrd in 2013 who was concerned for LN in neck, ordered FNA but was never performed. Has not had follow up since.       Medications:  Continuous Infusions:  Scheduled Meds:   enoxaparin  40 mg Subcutaneous Daily    hydrALAZINE        lactated ringers  500 mL Intravenous Once    melatonin  6 mg Oral Nightly     PRN Meds:acetaminophen, albuterol-ipratropium, aluminum-magnesium hydroxide-simethicone, dextrose 10%, dextrose 10%, glucagon (human recombinant), glucose, glucose, naloxone, ondansetron, polyethylene glycol, prochlorperazine, simethicone, sodium  chloride 0.9%, sodium chloride 0.9%     Current Facility-Administered Medications on File Prior to Encounter   Medication    [COMPLETED] albuterol-ipratropium 2.5 mg-0.5 mg/3 mL nebulizer solution 3 mL     No current outpatient medications on file prior to encounter.       Review of patient's allergies indicates:  No Known Allergies    Past Medical History:   Diagnosis Date    Cancer     Squamous cell carcinoma of larynx      Past Surgical History:   Procedure Laterality Date    laryngectomy      TRACHEOSTOMY       Family History    None       Tobacco Use    Smoking status: Never Smoker    Smokeless tobacco: Never Used   Substance and Sexual Activity    Alcohol use: Yes    Drug use: Not Currently    Sexual activity: Not Currently     Review of Systems   Constitutional: Negative for chills, fatigue, fever and unexpected weight change.   HENT: Positive for trouble swallowing. Negative for congestion, ear discharge, ear pain, hearing loss, nosebleeds and sore throat.    Eyes: Negative for pain and visual disturbance.   Respiratory: Positive for shortness of breath. Negative for cough and stridor.    Cardiovascular: Negative for chest pain.   Gastrointestinal: Negative for abdominal pain, nausea and vomiting.   Skin: Negative for rash and wound.     Objective:     Vital Signs (Most Recent):  Temp: 97.6 °F (36.4 °C) (02/16/22 1140)  Pulse: 90 (02/16/22 1140)  Resp: 20 (02/16/22 1140)  BP: (!) 186/93 (02/16/22 1140)  SpO2: 96 % (02/16/22 1140) Vital Signs (24h Range):  Temp:  [97.2 °F (36.2 °C)-98.8 °F (37.1 °C)] 97.6 °F (36.4 °C)  Pulse:  [] 90  Resp:  [13-24] 20  SpO2:  [70 %-100 %] 96 %  BP: (112-186)/() 186/93     Weight: 53 kg (116 lb 13.5 oz)  Body mass index is 17.51 kg/m².    Date 02/16/22 0700 - 02/17/22 0659   Shift 6230-0898 8272-6382 3234-7850 24 Hour Total   INTAKE   IV Piggyback 250   250   Shift Total(mL/kg) 250(4.7)   250(4.7)   OUTPUT   Shift Total(mL/kg)       Weight (kg) 53 53 53  53       Physical Exam  Vitals reviewed.   Constitutional:       General: He is in acute distress.      Appearance: He is normal weight. He is not ill-appearing or toxic-appearing.   HENT:      Head: Normocephalic and atraumatic.      Jaw: No trismus, tenderness or malocclusion.      Salivary Glands: Right salivary gland is not diffusely enlarged or tender. Left salivary gland is not diffusely enlarged or tender.      Right Ear: Hearing and external ear normal. No decreased hearing noted. No drainage.      Left Ear: Hearing and external ear normal. No decreased hearing noted. No drainage.      Nose: Nose normal. No nasal deformity, signs of injury, congestion or rhinorrhea.      Right Nostril: No epistaxis.      Left Nostril: No epistaxis.      Mouth/Throat:      Lips: Pink. No lesions.      Mouth: Mucous membranes are moist. No oral lesions or angioedema.      Dentition: No gum lesions.      Tongue: No lesions. Tongue does not deviate from midline.      Palate: No mass and lesions.      Pharynx: Oropharynx is clear. Uvula midline. No pharyngeal swelling or posterior oropharyngeal erythema.      Tonsils: No tonsillar exudate or tonsillar abscesses.      Comments: Aphonic  MMM  Full tongue protrusion  Eyes:      General: Lids are normal.         Right eye: No discharge.         Left eye: No discharge.      Extraocular Movements: Extraocular movements intact.      Conjunctiva/sclera: Conjunctivae normal.      Pupils: Pupils are equal, round, and reactive to light.   Neck:      Thyroid: No thyroid mass or thyromegaly.      Trachea: Phonation normal.      Comments: Laryngectomy stoma noted to be deep and stenotic, with large dry crust occupying about 90% of the stoma. Saline bullets used to loosen the crust and remove with forceps. Breathing instantly improved. Minimal oozing of blood. Deep flexible suctioning performed. 4-0 cuffless trach placed in stoma.   Cardiovascular:      Rate and Rhythm: Normal rate.    Pulmonary:      Effort: No accessory muscle usage, respiratory distress or retractions.      Breath sounds: Stridor present.      Comments: Increased work of breathing noted, with retractions  Musculoskeletal:      Cervical back: Normal range of motion and neck supple. No tenderness. No muscular tenderness.   Lymphadenopathy:      Cervical: No cervical adenopathy.   Neurological:      Mental Status: He is alert and oriented to person, place, and time.      Cranial Nerves: Cranial nerves are intact. No facial asymmetry.         Significant Labs:  BMP:   Recent Labs   Lab 02/16/22  0003         CO2 21*   BUN 14   CREATININE 0.8   CALCIUM 10.3     CBC:   Recent Labs   Lab 02/16/22  0003   WBC 7.62   RBC 4.60   HGB 15.3   HCT 47.6      *   MCH 33.3*   MCHC 32.1       Significant Diagnostics:  I have reviewed and interpreted all pertinent imaging results/findings within the past 24 hours.    Assessment/Plan:     Squamous cell carcinoma of larynx  S/p laryngectomy in 2008, no adjuvant tx. Last follow up in 2013, no further surveillance. Presents with food impaction, EGD wnl. Also with difficulty breathing and large crust within stenotic laryngectomy stoma.    - Large crust removed from stoma with instant improvement in breathing  - 4-0 cuffless Shiley trach placed into stoma with moderate resistance. Will attempt to upsize to 6-0 and eventually place laryngectomy tube. Pt may ultimately need stoma revision.   - Recommend humidified O2  - Recommend saline bullets with RT q4h while awake  - Recommend frequent gentle suctioning with flexible suction catheters  - Recommend SLP consultation  - Please keep 4-0 and 6-0 inner cannulas at bedside  - Please bring 6-0 cuffless Shiley to bedside (may need to request from RT)  - Obturator taped to headboard  - Pt is a neck breather, and can only be intubated through stoma in case of emergency  - Rest of care per primary team  - Please page w  questions    Case discussed w Dr. Schultz      VTE Risk Mitigation (From admission, onward)         Ordered     enoxaparin injection 40 mg  Daily         02/15/22 2231     IP VTE HIGH RISK PATIENT  Once         02/15/22 2231     Place sequential compression device  Until discontinued         02/15/22 2231                Thank you for your consult. I will follow-up with patient. Please contact us if you have any additional questions.    Rao Crowell MD  Otorhinolaryngology-Head & Neck Surgery  [facility] - Telemetry Stepdown ([place]

## 2022-02-16 NOTE — PROGRESS NOTES
[facility] Medicine  Progress Note    Patient Name: Silvio Mayorga  MRN: [id number]  Patient Class: OP- Observation   Admission Date: 2/15/2022  Length of Stay: 0 days  Attending Physician: Dia Castro MD  Primary Care Provider: Primary Doctor No        Subjective:     Principal Problem:Food impaction of esophagus        HPI:  Silvio Mayorga is a 81 y.o. male with a PMHx of laryngeal cancer s/p total laryngectomy (2008) and tracheostomy placement (no adjuvant therapy) who presented to the ED with the feeling of a food impaction in esophagus. He swallowed a piece of pork last night when he felt it get stuck. This morning he was able to cough up a small piece of the food, but it still feels like something is in his throat. He went to the ED at [facility] and was transferred here for GI eval. Approximately 2 years ago he had a food impaction but was able to cough it up and has never required endoscopy for removal. He is able to tolerate liquids, was witnessed drinking water in ED. He is swallowing his saliva. No airway compromise and he denies SOB, abdominal pain, N/V, and chest pain.      He received glucagon at outside facility. Patient was able to swallow half a cup of water in ED and felt it go down.     ED: Afebrile, /102, , on trach collar. CBC, CMP without acute abnormalities. CXR without acute process.      Overview/Hospital Course:  81 y.o male admitted for GI evaluation for possible food impaction. Maintained airway and tolerated secretions and water. EGD done 12/16 showing no food bolus or evidence of recent impaction seen in the entire esophagus. Gastritis present and normal examined duodenum. Was cleared by GI for discharge home on chopped-up diet but had frequent oxygen saturations in 70-80's. Improved to 90s with 35% trach collar. RT evaluated, difficulty with suctioning tracheostomy site. ENT was consulted and found dry crust occupying about 90% of the stoma, which  was cleared with improvement of his respiratory status. Patient to discharge home with  services when medically stable.       Interval History: Patient AF, no leukocytosis. Frequent o2 desaturations while on RA to 70-80s. Placed on 35% O2 with trach-collar with improvement to 90s per RT, though there has been difficulty with oxygen readings per RN. Vitals otherwise with some hypertension SBP>180, tachycardia, and tachypnea on occasion. Patient is not on any home meds for HTN. Also EGD with gastritis and normal duodenum but no impacted food seen throughout the esophagus. Patient still complaining of FB sensation in mouth/throat (points to left side of neck/submandibular area). RT attempted suctioning of the tracheostomy site. ENT was consulted.    Review of Systems   Unable to perform ROS: Patient nonverbal   HENT: Negative for drooling.         FB sensation   Respiratory: Positive for shortness of breath. Negative for cough.    Cardiovascular: Negative for chest pain.   Gastrointestinal: Negative for abdominal pain.     Objective:     Vital Signs (Most Recent):  Temp: 97.6 °F (36.4 °C) (02/16/22 1140)  Pulse: 90 (02/16/22 1140)  Resp: 20 (02/16/22 1140)  BP: (!) 186/93 (02/16/22 1140)  SpO2: 96 % (02/16/22 1140) Vital Signs (24h Range):  Temp:  [97.2 °F (36.2 °C)-98.8 °F (37.1 °C)] 97.6 °F (36.4 °C)  Pulse:  [] 90  Resp:  [13-24] 20  SpO2:  [70 %-100 %] 96 %  BP: (112-198)/() 186/93     Weight: 53 kg (116 lb 13.5 oz)  Body mass index is 17.51 kg/m².    Intake/Output Summary (Last 24 hours) at 2/16/2022 1200  Last data filed at 2/16/2022 0918  Gross per 24 hour   Intake 250 ml   Output --   Net 250 ml      Physical Exam  Vitals and nursing note reviewed.   Constitutional:       Appearance: He is normal weight.   HENT:      Head: Normocephalic and atraumatic.      Right Ear: External ear normal.      Left Ear: External ear normal.      Nose: Nose normal.      Mouth/Throat:      Mouth: Mucous membranes  are moist.      Pharynx: Oropharynx is clear.      Comments: Missing multiple teeth. Poor dentition otherwise. No FB's seen.  Eyes:      Extraocular Movements: Extraocular movements intact.   Neck:      Comments: Left submandibular area with mass without fluctuance or induration, but patient notes FB sensation and slight tenderness.   Cardiovascular:      Rate and Rhythm: Normal rate and regular rhythm.      Pulses: Normal pulses.      Heart sounds: Normal heart sounds.   Pulmonary:      Comments: Patient without stridor. Breathing appears somewhat labored, wife notes more effort that usual. O2 sats in 70-80 on RA.   Chest:      Chest wall: No tenderness.   Abdominal:      General: Abdomen is flat.      Palpations: Abdomen is soft.      Tenderness: There is no abdominal tenderness.   Musculoskeletal:         General: No swelling. Normal range of motion.      Cervical back: Normal range of motion. No rigidity.   Skin:     General: Skin is dry.      Comments: Cool extremities   Neurological:      Mental Status: He is alert. Mental status is at baseline.      Comments: Patient uses lip reading for communication. At baseline per wife. Follows commands         Significant Labs: All pertinent labs within the past 24 hours have been reviewed.    Significant Imaging: I have reviewed all pertinent imaging results/findings within the past 24 hours.      Assessment/Plan:      * Food impaction of esophagus  81 y.o. male with history of history of laryngeal cancer s/p total laryngectomy (2008) and tracheostomy placement (no adjuvant therapy) who presents with feeling of food impaction in esophagus. Had food impaction approx 2 years ago that did not require EGD, he was able to cough it up.      - in ED able to tolerate PO liquid and secretions. Maintaining airway  - s/p glucagon at outside ED  - GI consulted, appreciate recs  - EDG 2/16 without impacted food seen in the entirety of the esophagus. Gastritis was present, duodenum  normal.   - ENT consulted. Patient still with FB sensation. Oxygen desaturations. Requiring trach collar with 35% O2 to maintain O2 saturation. Patient has history of removing trach collar.     Squamous cell carcinoma of larynx  s/p laryngectomy  tracheostomy in place    s/p laryngectomy in 2008, no adjuvant tx. Last follow up in 2013, no further surveillance.    - respiratory failure noted following EGD, likely secondary to stoma obstruction   - ENT consulted   - Large crust removed from stoma with instant improvement in breathing  - 4-0 cuffless Shiley trach placed into stoma with moderate resistance. Will attempt to upsize to 6-0 and eventually place laryngectomy tube. Pt may ultimately need stoma revision.   - Recommend humidified O2  - Recommend saline bullets with RT q4h while awake  - Recommend frequent gentle suctioning with flexible suction catheters  - Recommend SLP consultation  - Please keep 4-0 and 6-0 inner cannulas at bedside  - Please bring 6-0 cuffless Shiley to bedside (may need to request from RT)  - Obturator taped to headboard  - Pt is a neck breather, and can only be intubated through stoma in case of emergency    Alcohol abuse  Held benzo as not to decrease respiratory drive, now ok to give if needed   - IVFs for volume support  - CIWA  - aspiration precautions   - telemetry   - neuro checks, vital signs Q4H    VTE Risk Mitigation (From admission, onward)         Ordered     enoxaparin injection 40 mg  Daily         02/15/22 2231     IP VTE HIGH RISK PATIENT  Once         02/15/22 2231     Place sequential compression device  Until discontinued         02/15/22 2231                Discharge Planning   WINIFRED: 2/16/2022     Code Status: Full Code   Is the patient medically ready for discharge?: No    Reason for patient still in hospital (select all that apply): Patient trending condition, Consult recommendations and Pending disposition                     Mayank Bacon PA-C  Department of  [facility] Medicine   [facility] - Endoscopy

## 2022-02-16 NOTE — SUBJECTIVE & OBJECTIVE
Medications:  Continuous Infusions:  Scheduled Meds:   enoxaparin  40 mg Subcutaneous Daily    hydrALAZINE        lactated ringers  500 mL Intravenous Once    melatonin  6 mg Oral Nightly     PRN Meds:acetaminophen, albuterol-ipratropium, aluminum-magnesium hydroxide-simethicone, dextrose 10%, dextrose 10%, glucagon (human recombinant), glucose, glucose, naloxone, ondansetron, polyethylene glycol, prochlorperazine, simethicone, sodium chloride 0.9%, sodium chloride 0.9%     Current Facility-Administered Medications on File Prior to Encounter   Medication    [COMPLETED] albuterol-ipratropium 2.5 mg-0.5 mg/3 mL nebulizer solution 3 mL     No current outpatient medications on file prior to encounter.       Review of patient's allergies indicates:  No Known Allergies    Past Medical History:   Diagnosis Date    Cancer     Squamous cell carcinoma of larynx      Past Surgical History:   Procedure Laterality Date    laryngectomy      TRACHEOSTOMY       Family History    None       Tobacco Use    Smoking status: Never Smoker    Smokeless tobacco: Never Used   Substance and Sexual Activity    Alcohol use: Yes    Drug use: Not Currently    Sexual activity: Not Currently     Review of Systems   Constitutional: Negative for chills, fatigue, fever and unexpected weight change.   HENT: Positive for trouble swallowing. Negative for congestion, ear discharge, ear pain, hearing loss, nosebleeds and sore throat.    Eyes: Negative for pain and visual disturbance.   Respiratory: Positive for shortness of breath. Negative for cough and stridor.    Cardiovascular: Negative for chest pain.   Gastrointestinal: Negative for abdominal pain, nausea and vomiting.   Skin: Negative for rash and wound.     Objective:     Vital Signs (Most Recent):  Temp: 97.6 °F (36.4 °C) (02/16/22 1140)  Pulse: 90 (02/16/22 1140)  Resp: 20 (02/16/22 1140)  BP: (!) 186/93 (02/16/22 1140)  SpO2: 96 % (02/16/22 1140) Vital Signs (24h Range):  Temp:   [97.2 °F (36.2 °C)-98.8 °F (37.1 °C)] 97.6 °F (36.4 °C)  Pulse:  [] 90  Resp:  [13-24] 20  SpO2:  [70 %-100 %] 96 %  BP: (112-186)/() 186/93     Weight: 53 kg (116 lb 13.5 oz)  Body mass index is 17.51 kg/m².    Date 02/16/22 0700 - 02/17/22 0659   Shift 8830-8256 9832-2775 0698-9981 24 Hour Total   INTAKE   IV Piggyback 250   250   Shift Total(mL/kg) 250(4.7)   250(4.7)   OUTPUT   Shift Total(mL/kg)       Weight (kg) 53 53 53 53       Physical Exam  Vitals reviewed.   Constitutional:       General: He is in acute distress.      Appearance: He is normal weight. He is not ill-appearing or toxic-appearing.   HENT:      Head: Normocephalic and atraumatic.      Jaw: No trismus, tenderness or malocclusion.      Salivary Glands: Right salivary gland is not diffusely enlarged or tender. Left salivary gland is not diffusely enlarged or tender.      Right Ear: Hearing and external ear normal. No decreased hearing noted. No drainage.      Left Ear: Hearing and external ear normal. No decreased hearing noted. No drainage.      Nose: Nose normal. No nasal deformity, signs of injury, congestion or rhinorrhea.      Right Nostril: No epistaxis.      Left Nostril: No epistaxis.      Mouth/Throat:      Lips: Pink. No lesions.      Mouth: Mucous membranes are moist. No oral lesions or angioedema.      Dentition: No gum lesions.      Tongue: No lesions. Tongue does not deviate from midline.      Palate: No mass and lesions.      Pharynx: Oropharynx is clear. Uvula midline. No pharyngeal swelling or posterior oropharyngeal erythema.      Tonsils: No tonsillar exudate or tonsillar abscesses.      Comments: Aphonic  MMM  Full tongue protrusion  Eyes:      General: Lids are normal.         Right eye: No discharge.         Left eye: No discharge.      Extraocular Movements: Extraocular movements intact.      Conjunctiva/sclera: Conjunctivae normal.      Pupils: Pupils are equal, round, and reactive to light.   Neck:       Thyroid: No thyroid mass or thyromegaly.      Trachea: Phonation normal.      Comments: Laryngectomy stoma noted to be deep and stenotic, with large dry crust occupying about 90% of the stoma. Saline bullets used to loosen the crust and remove with forceps. Breathing instantly improved. Minimal oozing of blood. Deep flexible suctioning performed. 4-0 cuffless trach placed in stoma.   Cardiovascular:      Rate and Rhythm: Normal rate.   Pulmonary:      Effort: No accessory muscle usage, respiratory distress or retractions.      Breath sounds: Stridor present.      Comments: Increased work of breathing noted, with retractions  Musculoskeletal:      Cervical back: Normal range of motion and neck supple. No tenderness. No muscular tenderness.   Lymphadenopathy:      Cervical: No cervical adenopathy.   Neurological:      Mental Status: He is alert and oriented to person, place, and time.      Cranial Nerves: Cranial nerves are intact. No facial asymmetry.         Significant Labs:  BMP:   Recent Labs   Lab 02/16/22  0003         CO2 21*   BUN 14   CREATININE 0.8   CALCIUM 10.3     CBC:   Recent Labs   Lab 02/16/22  0003   WBC 7.62   RBC 4.60   HGB 15.3   HCT 47.6      *   MCH 33.3*   MCHC 32.1       Significant Diagnostics:  I have reviewed and interpreted all pertinent imaging results/findings within the past 24 hours.

## 2022-02-16 NOTE — ANESTHESIA POSTPROCEDURE EVALUATION
Anesthesia Post Evaluation    Patient: Silvio Mayorga    Procedure(s) Performed: Procedure(s) (LRB):  EGD (ESOPHAGOGASTRODUODENOSCOPY) (N/A)    Final Anesthesia Type: general      Patient location during evaluation: PACU  Patient participation: Yes- Able to Participate  Level of consciousness: awake and alert  Post-procedure vital signs: reviewed and stable  Pain management: adequate  Airway patency: patent    PONV status at discharge: No PONV  Anesthetic complications: no      Cardiovascular status: blood pressure returned to baseline  Respiratory status: unassisted  Hydration status: euvolemic  Follow-up not needed.          Vitals Value Taken Time   /106 02/16/22 1229   Temp 36.4 °C (97.6 °F) 02/16/22 1140   Pulse 92 02/16/22 1234   Resp 18 02/16/22 1234   SpO2 96 % 02/16/22 1234   Vitals shown include unvalidated device data.      Event Time   Out of Recovery 10:01:00         Pain/Manuel Score: Manuel Score: 9 (2/16/2022  9:30 AM)

## 2022-02-16 NOTE — ED NOTES
Transport is here to take the patient to his bed on 7W tower at this time. Per respiratory the patient was not dependent on the O2 and that it could be disconnected long enough to transport to the floor.  Elissa RN called and made aware.  Saturations when O2 was disconnected were 95%.

## 2022-02-16 NOTE — SUBJECTIVE & OBJECTIVE
Past Medical History:   Diagnosis Date    Cancer     Squamous cell carcinoma of larynx        Past Surgical History:   Procedure Laterality Date    laryngectomy      TRACHEOSTOMY         Review of patient's allergies indicates:  No Known Allergies    Current Facility-Administered Medications on File Prior to Encounter   Medication    [COMPLETED] albuterol-ipratropium 2.5 mg-0.5 mg/3 mL nebulizer solution 3 mL    [COMPLETED] glucagon (human recombinant) injection 2 mg    [COMPLETED] hydrALAZINE injection 20 mg     No current outpatient medications on file prior to encounter.     Family History    No known pertinent family history.       Tobacco Use    Smoking status: Never Smoker    Smokeless tobacco: Never Used   Substance and Sexual Activity    Alcohol use: Yes    Drug use: Not on file    Sexual activity: Not on file     Review of Systems   Constitutional: Negative for activity change, chills and fever.   HENT: Positive for trouble swallowing. Negative for congestion.    Eyes: Negative for photophobia and visual disturbance.   Respiratory: Negative for chest tightness, shortness of breath and wheezing.    Cardiovascular: Negative for chest pain, palpitations and leg swelling.   Gastrointestinal: Negative for abdominal pain, constipation, diarrhea, nausea and vomiting.   Genitourinary: Negative for dysuria, frequency, hematuria and urgency.   Musculoskeletal: Negative for arthralgias, back pain and gait problem.   Skin: Negative for color change and rash.   Neurological: Negative for dizziness, syncope, weakness, light-headedness, numbness and headaches.   Psychiatric/Behavioral: Negative for agitation and confusion. The patient is not nervous/anxious.      Objective:     Vital Signs (Most Recent):  Temp: 98.8 °F (37.1 °C) (02/15/22 1837)  Pulse: 100 (02/15/22 2136)  Resp: (!) 21 (02/15/22 1836)  BP: (!) 154/102 (02/15/22 1836)  SpO2: 100 % (02/15/22 2138) Vital Signs (24h Range):  Temp:  [96.4 °F (35.8  °C)-98.8 °F (37.1 °C)] 98.8 °F (37.1 °C)  Pulse:  [] 100  Resp:  [20-24] 21  SpO2:  [94 %-100 %] 100 %  BP: (133-210)/() 154/102        There is no height or weight on file to calculate BMI.    Physical Exam  Vitals and nursing note reviewed.   Constitutional:       General: He is not in acute distress.     Appearance: He is well-developed.      Comments: No acute distress. Appears comfortable. Maintaining airway.   HENT:      Head: Normocephalic and atraumatic.      Mouth/Throat:      Pharynx: No oropharyngeal exudate.   Eyes:      Conjunctiva/sclera: Conjunctivae normal.      Pupils: Pupils are equal, round, and reactive to light.   Neck:      Trachea: Tracheostomy present.   Cardiovascular:      Rate and Rhythm: Normal rate and regular rhythm.      Heart sounds: Normal heart sounds.   Pulmonary:      Effort: Pulmonary effort is normal. No respiratory distress.      Breath sounds: Normal breath sounds. No wheezing.   Abdominal:      General: Bowel sounds are normal. There is no distension.      Palpations: Abdomen is soft.      Tenderness: There is no abdominal tenderness.   Musculoskeletal:         General: No tenderness. Normal range of motion.      Cervical back: Normal range of motion and neck supple.   Skin:     General: Skin is warm and dry.      Capillary Refill: Capillary refill takes less than 2 seconds.      Findings: No rash.   Neurological:      Mental Status: He is alert and oriented to person, place, and time.      Cranial Nerves: No cranial nerve deficit.      Sensory: No sensory deficit.      Coordination: Coordination normal.   Psychiatric:         Behavior: Behavior normal.         Thought Content: Thought content normal.         Judgment: Judgment normal.           CRANIAL NERVES     CN III, IV, VI   Pupils are equal, round, and reactive to light.       Significant Labs:   All pertinent labs within the past 24 hours have been reviewed.  CBC:   Recent Labs   Lab 02/15/22  1156   WBC  6.87   HGB 17.0   HCT 51.9        CMP:   Recent Labs   Lab 02/15/22  1156      K 4.2   CL 99   CO2 27   *   BUN 10   CREATININE 0.8   CALCIUM 10.9*   PROT 10.2*   ALBUMIN 4.3   BILITOT 0.8   ALKPHOS 109   AST 35   ALT 25   ANIONGAP 13   EGFRNONAA >60       Significant Imaging: I have reviewed all pertinent imaging results/findings within the past 24 hours.   X-Ray Chest 1 View  Narrative: EXAMINATION:  XR CHEST 1 VIEW    CLINICAL HISTORY:  Possible foreign body/food bolus in the esophagus;    TECHNIQUE:  Single frontal view of the chest was performed.    COMPARISON:  12/15/2009    FINDINGS:  The lungs are well expanded with coarse interstitial markings throughout.  Questionable reticular nodularity in the right lung apex which could represent a superimposed acute inflammatory/infectious process.  Heart size is normal.  Calcified atheromatous disease affects the aorta.  Age-appropriate degenerative changes affect the skeleton.  Impression: As above.    Electronically signed by: Kaylie Marley MD  Date:    02/15/2022  Time:    12:24

## 2022-02-16 NOTE — ASSESSMENT & PLAN NOTE
81 y.o. male with history of history of laryngeal cancer s/p total laryngectomy (2008) and tracheostomy placement (no adjuvant therapy) who presents with feeling of food impaction in esophagus. Had food impaction approx 2 years ago that did not require EGD, he was able to cough it up.      - in ED able to tolerate PO liquid and secretions. Maintaining airway  - s/p glucagon at outside ED  - GI consulted, appreciate recs  - plan for EGD tomorrow AM  - strict NPO after midnight. Ok for ice chips or sips of water before midnight.

## 2022-02-16 NOTE — RESPIRATORY THERAPY
ENT removed dried secretions plugging pts stoma and placed trach - secured with trach ties. RT able to decrease FiO2 to 28% with SpO2 remaining at 95%.  Pt breathing much better.  Will keep moist.  Pt educated on the importance of trach and aerosol.  Not sure of his future compliance.

## 2022-02-16 NOTE — ASSESSMENT & PLAN NOTE
S/p laryngectomy in 2008, no adjuvant tx. Last follow up in 2013, no further surveillance. Presents with food impaction, EGD wnl. Also with difficulty breathing and large crust within stenotic laryngectomy stoma.    - Large crust removed from stoma with instant improvement in breathing  - 4-0 cuffless Shiley trach placed into stoma with moderate resistance. Will attempt to upsize to 6-0 and eventually place laryngectomy tube. Pt may ultimately need stoma revision.   - Recommend humidified O2  - Recommend saline bullets with RT q4h while awake  - Recommend frequent gentle suctioning with flexible suction catheters  - Recommend SLP consultation  - Please keep 4-0 and 6-0 inner cannulas at bedside  - Please bring 6-0 cuffless Shiley to bedside (may need to request from RT)  - Obturator taped to headboard  - Pt is a neck breather, and can only be intubated through stoma in case of emergency  - Rest of care per primary team  - Please page w questions    Case discussed w Dr. Schultz

## 2022-02-16 NOTE — ED TRIAGE NOTES
Silvio Mayorga, a 81 y.o. male presents to the ED w/ complaint of food bolus transfer. Pt states eating two days ago, felt as though he got food stuck in his throat. Per wife, he appeared to loose consciousness during episode. Pt has trach in place. Presents 35% satting 100% on trach color. D/t trach pt uses lip reading as primary mode of communication. No complaints at time of assessment, cough and swallow reflexes noted.     Triage note:  Chief Complaint   Patient presents with    food bolus      Pt reports to ED via EMS from [facility] transfer  food bolus in esophagus, pt has old tracheostomy, 35% trach collar, 100%, Glucagon given,. Per EMS prior to leaving facility audible stridor present after breathing treatment. Pt aaox4      Review of patient's allergies indicates:  No Known Allergies  Past Medical History:   Diagnosis Date    Cancer     Squamous cell carcinoma of larynx

## 2022-02-16 NOTE — PLAN OF CARE
Goal: Absence of Hospital-Acquired Illness or Injury  Outcome: Ongoing, Progressing  Goal: Optimal Comfort and Wellbeing  Outcome: Ongoing, Progressing  Goal: Readiness for Transition of Care  Outcome: Ongoing, Progressing     Problem: Infection  Goal: Absence of Infection Signs and Symptoms  Outcome: Ongoing, Progressing     Problem: Adjustment to Illness (Delirium)  Goal: Optimal Coping  Outcome: Ongoing, Progressing     Problem: Altered Behavior (Delirium)  Goal: Improved Behavioral Control  Outcome: Ongoing, Progressing     Problem: Attention and Thought Clarity Impairment (Delirium)  Goal: Improved Attention and Thought Clarity  Outcome: Ongoing, Progressing     Problem: Sleep Disturbance (Delirium)  Goal: Improved Sleep  Outcome: Ongoing, Progressing     Problem: Fall Injury Risk  Goal: Absence of Fall and Fall-Related Injury  Outcome: Ongoing, Progressing     Problem: Swallowing Impairment  Goal: Optimal Eating and Swallowing Without Aspiration  Outcome: Ongoing, Progressing

## 2022-02-16 NOTE — ASSESSMENT & PLAN NOTE
81 y.o. male with history of history of laryngeal cancer s/p total laryngectomy (2008) and tracheostomy placement (no adjuvant therapy) who presents with feeling of food impaction in esophagus. Had food impaction approx 2 years ago that did not require EGD, he was able to cough it up.      - in ED able to tolerate PO liquid and secretions. Maintaining airway  - s/p glucagon at outside ED  - GI consulted, appreciate recs  - EDG 2/16 without impacted food seen in the entirety of the esophagus. Gastritis was present, duodenum normal.   - ENT consulted. Patient still with FB sensation. Oxygen desaturations. Requiring trach collar with 35% O2 to maintain O2 saturation. Patient has history of removing trach collar.

## 2022-02-16 NOTE — TRANSFER OF CARE
"Anesthesia Transfer of Care Note    Patient: Silvio Mayorga    Procedure(s) Performed: Procedure(s) (LRB):  EGD (ESOPHAGOGASTRODUODENOSCOPY) (N/A)    Patient location: PACU    Anesthesia Type: general    Transport from OR: Transported from OR on 100% O2 by closed face mask with adequate spontaneous ventilation    Post pain: adequate analgesia    Post assessment: no apparent anesthetic complications and tolerated procedure well    Post vital signs: stable    Level of consciousness: sedated and responds to stimulation    Nausea/Vomiting: no nausea/vomiting    Complications: none    Transfer of care protocol was followed      Last vitals:   Visit Vitals  /66 (BP Location: Right arm, Patient Position: Lying)   Pulse 96   Temp 36.2 °C (97.2 °F) (Temporal)   Resp 18   Ht 5' 8.5" (1.74 m)   Wt 53 kg (116 lb 13.5 oz)   SpO2 99%   BMI 17.51 kg/m²     "

## 2022-02-16 NOTE — NURSING TRANSFER
Nursing Transfer Note      2/16/2022     Reason patient is being transferred: per order    Transfer To: [id number]    Transfer via stretcher    Transported by PCT    Medicines sent: N/A    Any special needs or follow-up needed: N/A    Chart send with patient: Yes    Patient reassessed at: [id number] @ 3828

## 2022-02-16 NOTE — PROGRESS NOTES
[facility] - Telemetry Stepdown ([place] [address])  [facility] Medicine  Progress Note    Patient Name: Silvio Mayorga  MRN: [id number]  Patient Class: OP- Observation   Admission Date: 2/15/2022  Length of Stay: 0 days  Attending Physician: Dia Castro MD  Primary Care Provider: Primary Doctor No        Subjective:     Principal Problem:Food impaction of esophagus        HPI:  Silvio Mayorga is a 81 y.o. male with a PMHx of laryngeal cancer s/p total laryngectomy (2008) and tracheostomy placement (no adjuvant therapy) who presented to the ED with the feeling of a food impaction in esophagus. He swallowed a piece of pork last night when he felt it get stuck. This morning he was able to cough up a small piece of the food, but it still feels like something is in his throat. He went to the ED at [facility] and was transferred here for GI eval. Approximately 2 years ago he had a food impaction but was able to cough it up and has never required endoscopy for removal. He is able to tolerate liquids, was witnessed drinking water in ED. He is swallowing his saliva. No airway compromise and he denies SOB, abdominal pain, N/V, and chest pain.      He received glucagon at outside facility. Patient was able to swallow half a cup of water in ED and felt it go down.     ED: Afebrile, /102, , on trach collar. CBC, CMP without acute abnormalities. CXR without acute process.      Overview/Hospital Course:  81 y.o male admitted for GI evaluation for possible food impaction. Maintained airway and tolerated secretions and water. EGD done 12/16 showing no food bolus or evidence of recent impaction seen in the entire esophagus. Gastritis present and normal examined duodenum. Was cleared by GI for discharge home on chopped-up diet but had frequent oxygen saturations in 70-80's. Improved to 90s with 35% trach collar. RT evaluated, difficulty with suctioning tracheostomy site. ENT was consulted and found dry crust occupying about  90% of the stoma, which was cleared with improvement of his respiratory status. Patient to discharge home with  services when medically stable.       No new subjective & objective note has been filed under this hospital service since the last note was generated.      Assessment/Plan:      * Food impaction of esophagus  81 y.o. male with history of history of laryngeal cancer s/p total laryngectomy (2008) and tracheostomy placement (no adjuvant therapy) who presents with feeling of food impaction in esophagus. Had food impaction approx 2 years ago that did not require EGD, he was able to cough it up.      Suspect obstruction of stoma causing symptoms, not food obstruction, see below   - in ED able to tolerate PO liquid and secretions. Maintaining airway  - s/p glucagon at outside ED  - GI consulted, appreciate recs  - EDG 2/16 without impacted food seen in the entirety of the esophagus. Gastritis was present, duodenum normal.   - ENT consulted. Patient still with FB sensation. Oxygen desaturations. Requiring trach collar with 35% O2 to maintain O2 saturation. Patient has history of removing trach collar.     Squamous cell carcinoma of larynx  s/p laryngectomy  tracheostomy in place    s/p laryngectomy in 2008, no adjuvant tx. Last follow up in 2013, no further surveillance.    - respiratory failure noted following EGD, likely secondary to stoma obstruction   - ENT consulted   - Large crust removed from stoma with instant improvement in breathing  - 4-0 cuffless Shiley trach placed into stoma with moderate resistance. Will attempt to upsize to 6-0 and eventually place laryngectomy tube. Pt may ultimately need stoma revision.   - Recommend humidified O2  - Recommend saline bullets with RT q4h while awake  - Recommend frequent gentle suctioning with flexible suction catheters  - Recommend SLP consultation  - Please keep 4-0 and 6-0 inner cannulas at bedside  - Please bring 6-0 cuffless Shiley to bedside (may need to  request from RT)  - Obturator taped to headboard  - Pt is a neck breather, and can only be intubated through stoma in case of emergency    Alcohol abuse  Held benzo as not to decrease respiratory drive, now ok to give if needed   - IVFs for volume support  - CIWA  - aspiration precautions   - telemetry   - neuro checks, vital signs Q4H      VTE Risk Mitigation (From admission, onward)         Ordered     enoxaparin injection 40 mg  Daily         02/15/22 2231     IP VTE HIGH RISK PATIENT  Once         02/15/22 2231     Place sequential compression device  Until discontinued         02/15/22 2231                Discharge Planning   WINIFRED: 2/16/2022     Code Status: Full Code   Is the patient medically ready for discharge?: No    Reason for patient still in hospital (select all that apply): Patient trending condition, Laboratory test, Treatment and Consult recommendations                     Mayank Bacon PA-C  Department of Hospital Medicine   [facility] - Telemetry Stepdown ([place] [address])

## 2022-02-16 NOTE — H&P
[facility] Dept  [facility]  History & Physical    Patient Name: Silvio Mayorga  MRN: [id number]  Patient Class: OP- Observation  Admission Date: 2/15/2022  Attending Physician: Dia Castro MD   Primary Care Provider: Primary Doctor No         Patient information was obtained from patient, past medical records and ER records.     Subjective:     Principal Problem:Food impaction of esophagus    Chief Complaint:   Chief Complaint   Patient presents with    food bolus      Pt reports to ED via EMS from [facility] transfer  food bolus in esophagus, pt has old tracheostomy, 35% trach collar, 100%, Glucagon given,. Per EMS prior to leaving facility audible stridor present after breathing treatment. Pt aaox4         HPI: Silvio Mayorga is a 81 y.o. male with a PMHx of laryngeal cancer s/p total laryngectomy (2008) and tracheostomy placement (no adjuvant therapy) who presented to the ED with the feeling of a food impaction in esophagus. He swallowed a piece of pork last night when he felt it get stuck. This morning he was able to cough up a small piece of the food, but it still feels like something is in his throat. He went to the ED at [facility] and was transferred here for GI eval. Approximately 2 years ago he had a food impaction but was able to cough it up and has never required endoscopy for removal. He is able to tolerate liquids, was witnessed drinking water in ED. He is swallowing his saliva. No airway compromise and he denies SOB, abdominal pain, N/V, and chest pain.      He received glucagon at outside facility. Patient was able to swallow half a cup of water in ED and felt it go down.     ED: Afebrile, /102, , on trach collar. CBC, CMP without acute abnormalities. CXR without acute process.      Past Medical History:   Diagnosis Date    Cancer     Squamous cell carcinoma of larynx        Past Surgical History:   Procedure Laterality Date    laryngectomy      TRACHEOSTOMY          Review of patient's allergies indicates:  No Known Allergies    Current Facility-Administered Medications on File Prior to Encounter   Medication    [COMPLETED] albuterol-ipratropium 2.5 mg-0.5 mg/3 mL nebulizer solution 3 mL    [COMPLETED] glucagon (human recombinant) injection 2 mg    [COMPLETED] hydrALAZINE injection 20 mg     No current outpatient medications on file prior to encounter.     Family History    No known pertinent family history.       Tobacco Use    Smoking status: Never Smoker    Smokeless tobacco: Never Used   Substance and Sexual Activity    Alcohol use: Yes    Drug use: Not on file    Sexual activity: Not on file     Review of Systems   Constitutional: Negative for activity change, chills and fever.   HENT: Positive for trouble swallowing. Negative for congestion.    Eyes: Negative for photophobia and visual disturbance.   Respiratory: Negative for chest tightness, shortness of breath and wheezing.    Cardiovascular: Negative for chest pain, palpitations and leg swelling.   Gastrointestinal: Negative for abdominal pain, constipation, diarrhea, nausea and vomiting.   Genitourinary: Negative for dysuria, frequency, hematuria and urgency.   Musculoskeletal: Negative for arthralgias, back pain and gait problem.   Skin: Negative for color change and rash.   Neurological: Negative for dizziness, syncope, weakness, light-headedness, numbness and headaches.   Psychiatric/Behavioral: Negative for agitation and confusion. The patient is not nervous/anxious.      Objective:     Vital Signs (Most Recent):  Temp: 98.8 °F (37.1 °C) (02/15/22 1837)  Pulse: 100 (02/15/22 2136)  Resp: (!) 21 (02/15/22 1836)  BP: (!) 154/102 (02/15/22 1836)  SpO2: 100 % (02/15/22 2138) Vital Signs (24h Range):  Temp:  [96.4 °F (35.8 °C)-98.8 °F (37.1 °C)] 98.8 °F (37.1 °C)  Pulse:  [] 100  Resp:  [20-24] 21  SpO2:  [94 %-100 %] 100 %  BP: (133-210)/() 154/102        There is no height or weight on file  to calculate BMI.    Physical Exam  Vitals and nursing note reviewed.   Constitutional:       General: He is not in acute distress.     Appearance: He is well-developed.      Comments: No acute distress. Appears comfortable. Maintaining airway.   HENT:      Head: Normocephalic and atraumatic.      Mouth/Throat:      Pharynx: No oropharyngeal exudate.   Eyes:      Conjunctiva/sclera: Conjunctivae normal.      Pupils: Pupils are equal, round, and reactive to light.   Neck:      Trachea: Tracheostomy present.   Cardiovascular:      Rate and Rhythm: Normal rate and regular rhythm.      Heart sounds: Normal heart sounds.   Pulmonary:      Effort: Pulmonary effort is normal. No respiratory distress.      Breath sounds: Normal breath sounds. No wheezing.   Abdominal:      General: Bowel sounds are normal. There is no distension.      Palpations: Abdomen is soft.      Tenderness: There is no abdominal tenderness.   Musculoskeletal:         General: No tenderness. Normal range of motion.      Cervical back: Normal range of motion and neck supple.   Skin:     General: Skin is warm and dry.      Capillary Refill: Capillary refill takes less than 2 seconds.      Findings: No rash.   Neurological:      Mental Status: He is alert and oriented to person, place, and time.      Cranial Nerves: No cranial nerve deficit.      Sensory: No sensory deficit.      Coordination: Coordination normal.   Psychiatric:         Behavior: Behavior normal.         Thought Content: Thought content normal.         Judgment: Judgment normal.           CRANIAL NERVES     CN III, IV, VI   Pupils are equal, round, and reactive to light.       Significant Labs:   All pertinent labs within the past 24 hours have been reviewed.  CBC:   Recent Labs   Lab 02/15/22  1156   WBC 6.87   HGB 17.0   HCT 51.9        CMP:   Recent Labs   Lab 02/15/22  1156      K 4.2   CL 99   CO2 27   *   BUN 10   CREATININE 0.8   CALCIUM 10.9*   PROT 10.2*    ALBUMIN 4.3   BILITOT 0.8   ALKPHOS 109   AST 35   ALT 25   ANIONGAP 13   EGFRNONAA >60       Significant Imaging: I have reviewed all pertinent imaging results/findings within the past 24 hours.   X-Ray Chest 1 View  Narrative: EXAMINATION:  XR CHEST 1 VIEW    CLINICAL HISTORY:  Possible foreign body/food bolus in the esophagus;    TECHNIQUE:  Single frontal view of the chest was performed.    COMPARISON:  12/15/2009    FINDINGS:  The lungs are well expanded with coarse interstitial markings throughout.  Questionable reticular nodularity in the right lung apex which could represent a superimposed acute inflammatory/infectious process.  Heart size is normal.  Calcified atheromatous disease affects the aorta.  Age-appropriate degenerative changes affect the skeleton.  Impression: As above.    Electronically signed by: Kaylie Marley MD  Date:    02/15/2022  Time:    12:24        Assessment/Plan:     * Food impaction of esophagus  81 y.o. male with history of history of laryngeal cancer s/p total laryngectomy (2008) and tracheostomy placement (no adjuvant therapy) who presents with feeling of food impaction in esophagus. Had food impaction approx 2 years ago that did not require EGD, he was able to cough it up.      - in ED able to tolerate PO liquid and secretions. Maintaining airway  - s/p glucagon at outside ED  - GI consulted, appreciate recs  - plan for EGD tomorrow AM  - strict NPO after midnight. Ok for ice chips or sips of water before midnight.    Squamous cell carcinoma of larynx  - s/p laryngectomy  - tracheostomy in place    VTE Risk Mitigation (From admission, onward)         Ordered     enoxaparin injection 40 mg  Daily         02/15/22 2231     IP VTE HIGH RISK PATIENT  Once         02/15/22 2231     Place sequential compression device  Until discontinued         02/15/22 2231                   Christine Pedroza PA-C  Department of Hospital Medicine   [facility] - Emergency Dept

## 2022-02-16 NOTE — SUBJECTIVE & OBJECTIVE
Interval History: Patient AF, no leukocytosis. Frequent o2 desaturations while on RA to 70-80s. Placed on 35% O2 with trach-collar with improvement to 90s per RT, though there has been difficulty with oxygen readings per RN. Vitals otherwise with some hypertension SBP>180, tachycardia, and tachypnea on occasion. Patient is not on any home meds for HTN. Also EGD with gastritis and normal duodenum but no impacted food seen throughout the esophagus. Patient still complaining of FB sensation in mouth/throat (points to left side of neck/submandibular area). RT attempted suctioning of the tracheostomy site. ENT was consulted.    Review of Systems   Unable to perform ROS: Patient nonverbal   HENT: Negative for drooling.         FB sensation   Respiratory: Positive for shortness of breath. Negative for cough.    Cardiovascular: Negative for chest pain.   Gastrointestinal: Negative for abdominal pain.     Objective:     Vital Signs (Most Recent):  Temp: 97.6 °F (36.4 °C) (02/16/22 1140)  Pulse: 90 (02/16/22 1140)  Resp: 20 (02/16/22 1140)  BP: (!) 186/93 (02/16/22 1140)  SpO2: 96 % (02/16/22 1140) Vital Signs (24h Range):  Temp:  [97.2 °F (36.2 °C)-98.8 °F (37.1 °C)] 97.6 °F (36.4 °C)  Pulse:  [] 90  Resp:  [13-24] 20  SpO2:  [70 %-100 %] 96 %  BP: (112-198)/() 186/93     Weight: 53 kg (116 lb 13.5 oz)  Body mass index is 17.51 kg/m².    Intake/Output Summary (Last 24 hours) at 2/16/2022 1200  Last data filed at 2/16/2022 0918  Gross per 24 hour   Intake 250 ml   Output --   Net 250 ml      Physical Exam  Vitals and nursing note reviewed.   Constitutional:       Appearance: He is normal weight.   HENT:      Head: Normocephalic and atraumatic.      Right Ear: External ear normal.      Left Ear: External ear normal.      Nose: Nose normal.      Mouth/Throat:      Mouth: Mucous membranes are moist.      Pharynx: Oropharynx is clear.      Comments: Missing multiple teeth. Poor dentition otherwise. No FB's  seen.  Eyes:      Extraocular Movements: Extraocular movements intact.   Neck:      Comments: Left submandibular area with mass without fluctuance or induration, but patient notes FB sensation and slight tenderness.   Cardiovascular:      Rate and Rhythm: Normal rate and regular rhythm.      Pulses: Normal pulses.      Heart sounds: Normal heart sounds.   Pulmonary:      Comments: Patient without stridor. Breathing appears somewhat labored, wife notes more effort that usual. O2 sats in 70-80 on RA.   Chest:      Chest wall: No tenderness.   Abdominal:      General: Abdomen is flat.      Palpations: Abdomen is soft.      Tenderness: There is no abdominal tenderness.   Musculoskeletal:         General: No swelling. Normal range of motion.      Cervical back: Normal range of motion. No rigidity.   Skin:     General: Skin is dry.      Comments: Cool extremities   Neurological:      Mental Status: He is alert. Mental status is at baseline.      Comments: Patient uses lip reading for communication. At baseline per wife. Follows commands         Significant Labs: All pertinent labs within the past 24 hours have been reviewed.    Significant Imaging: I have reviewed all pertinent imaging results/findings within the past 24 hours.

## 2022-02-16 NOTE — CONSULTS
[facility]  Gastroenterology Service  Consult Note    Patient Name: Silvio Mayorga  MRN: [id number]  Admission Date: 2/15/2022  Hospital Length of Stay: 0 days  Code Status: No Order   Attending Provider: Gumaro Martinez DO   Consulting Provider: David Camraena MD  Primary Care Physician: Primary Doctor No  Principal Problem:<principal problem not specified>    Inpatient consult to Gastroenterology  Consult performed by: David Camarena MD  Consult ordered by: Dawit Doyle MD        Subjective:     HPI: Silvio Mayorga is a 81 y.o. male with history of laryngeal cancer s/p total laryngectomy (2008) and tracheostomy placement (no adjuvant therapy) who presents with feeling of food impaction in esophagus.    Patient reports swallowing a piece of pork last night and feeling it get stuck since then in his esophagus. This morning, he coughed up a small piece of the food, but still feels something stuck there. He went to [facility] and was transferred here. He reports a similar food impaction about two years ago for which he swallowed something which made him cough it up. He has never had or required an endoscopy for this. No blood thinners. Denies history of radiation to his neck.  He received glucagon at outside facility.  Patient was able to swallow half a cup of water during my interview and felt it go down.    Afebrile, /102, , on trach collar.      No past medical history on file.    No past surgical history on file.    No family history on file.    Social History     Socioeconomic History    Marital status:    Tobacco Use    Smoking status: Never Smoker    Smokeless tobacco: Never Used   Substance and Sexual Activity    Alcohol use: Yes       Current Facility-Administered Medications on File Prior to Encounter   Medication Dose Route Frequency Provider Last Rate Last Admin    [COMPLETED] albuterol-ipratropium 2.5 mg-0.5 mg/3 mL nebulizer solution 3 mL  3 mL  Nebulization ED 1 Time Jared Perkins MD   3 mL at 02/15/22 1730    [COMPLETED] glucagon (human recombinant) injection 2 mg  2 mg Intravenous ED 1 Time Jared Perkins MD   2 mg at 02/15/22 1158    [COMPLETED] hydrALAZINE injection 20 mg  20 mg Intravenous ED 1 Time Jared Perkins MD   20 mg at 02/15/22 1153     No current outpatient medications on file prior to encounter.       Review of patient's allergies indicates:  No Known Allergies    Review of Systems:   Constitutional: no fever, chills or change in weight   Eyes: no visual changes   ENT: no sore throat or dysphagia  Respiratory: + cough  Cardiovascular: no chest pain or palpitations   Gastrointestinal: as per HPI  Hematologic/Lymphatic: no easy bruising or lymphadenopathy   Musculoskeletal: no arthralgias or myalgias   Neurological: no change in mental status  Behavioral/Psych: no change in mood    Objective:     Vitals:    02/15/22 2136   BP:    Pulse: 100   Resp:    Temp:        General: Alert and Oriented  HEENT: Normocephalic, Atraumatic. No scleral icterus. On trach collar.  Resp:  Effort normal  Cardiac: RRR  Abdomen: Normoactive bowel sounds. Non-distended. Soft. Non-tender. No peritoneal signs.  Extremities: No peripheral edema.   Neurologic: No gross neurological Deficits  Psych: Calm, cooperative. Normal mood and affect.    Significant Labs:  Recent Labs   Lab 02/15/22  1156   HGB 17.0       Lab Results   Component Value Date    WBC 6.87 02/15/2022    HGB 17.0 02/15/2022    HCT 51.9 02/15/2022     (H) 02/15/2022     02/15/2022       Lab Results   Component Value Date     02/15/2022    K 4.2 02/15/2022    CL 99 02/15/2022    CO2 27 02/15/2022    BUN 10 02/15/2022    CREATININE 0.8 02/15/2022    CALCIUM 10.9 (H) 02/15/2022    ANIONGAP 13 02/15/2022    ESTGFRAFRICA >60 02/15/2022    EGFRNONAA >60 02/15/2022       Lab Results   Component Value Date    ALT 25 02/15/2022    AST 35 02/15/2022    ALKPHOS 109 02/15/2022     BILITOT 0.8 02/15/2022       Lab Results   Component Value Date    INR 0.9 10/20/2008       Significant Imaging:  Reviewed pertinent radiology findings.       Assessment/Plan:     Silvio Mayorga is a 81 y.o. male with history of history of laryngeal cancer s/p total laryngectomy (2008) and tracheostomy placement (no adjuvant therapy) who presents with feeling of food impaction in esophagus.    Currently tolerating secretions. Has trach in place. Able to tolerate PO liquids and report of coughing up a small portion of food. Suspect either esophageal irritation from recent food or a partial obstruction.    Problem List:  1. Food impaction of esophagus    Plan:  - plan for EGD tomorrow AM  - keep NPO after midnight. Ok for ice chips or sips of water before midnight.      Thank you for involving us in the care of Silvio Mayorga. Please call with any additional questions, concerns or changes in the patient's clinical status.    David Camarena MD  Gastroenterology Fellow PGY VI   [facility]

## 2022-02-16 NOTE — ED NOTES
LOC: The patient is awake, alert, and oriented to self, place, time, and situation. Pt is calm and cooperative. Affect is appropriate.  Pt is uses written communication and lip reading for mode of communication.     APPEARANCE: Patient resting comfortably in no acute distress.  Patient is clean and well groomed.    SKIN: The skin is warm and dry; color consistent with ethnicity.  Patient has normal skin turgor and moist mucus membranes.  Skin intact; no breakdown or bruising noted.     MUSCULOSKELETAL: Patient moving upper and lower extremities without difficulty; denies pain in the extremities or back.  Generalized weakness.     RESPIRATORY: Artificial airway in place as surgical trach. No cannula in place, per pt this is standard. Airway is open and patent. Respirations spontaneous, even, easy, and non-labored.  Patient has a normal effort and rate.  No accessory muscle use noted. Intermittent cough. Pt on trach collar 35% O2.    CARDIAC:  Normal rate noted.  No peripheral edema noted. No complaints of chest pain.     ABDOMEN: Soft and non tender to palpation.  No distention noted. Pt denies abdominal pain; denies nausea, vomiting, diarrhea, or constipation.    NEUROLOGIC: Eyes open spontaneously.  Behavior appropriate to situation.  Follows commands; facial expression symmetrical.  Purposeful motor response noted; normal sensation in all extremities. Pt denies headache; denies lightheadedness or dizziness; denies visual disturbances; denies loss of balance; denies unilateral weakness.

## 2022-02-16 NOTE — RESPIRATORY THERAPY
Pt arrived back from Endoscopy and placed back on 35% trach aerosol.  Pt's trach seemed to be somewhat occluded.  RT tried to suction with catheter and clean stoma with various moist methods to no avail.  Messaged attending to inform and PA responded promptly to assess pt.  ENT consulted.  Will monitor.

## 2022-02-17 PROBLEM — J39.8 TRACHEAL STENOSIS: Status: ACTIVE | Noted: 2022-02-15

## 2022-02-17 LAB
ANION GAP SERPL CALC-SCNC: 11 MMOL/L (ref 8–16)
BASOPHILS # BLD AUTO: 0.05 K/UL (ref 0–0.2)
BASOPHILS NFR BLD: 0.9 % (ref 0–1.9)
BUN SERPL-MCNC: 23 MG/DL (ref 8–23)
CALCIUM SERPL-MCNC: 9.6 MG/DL (ref 8.7–10.5)
CHLORIDE SERPL-SCNC: 106 MMOL/L (ref 95–110)
CO2 SERPL-SCNC: 26 MMOL/L (ref 23–29)
CREAT SERPL-MCNC: 0.9 MG/DL (ref 0.5–1.4)
DIFFERENTIAL METHOD: ABNORMAL
EOSINOPHIL # BLD AUTO: 0 K/UL (ref 0–0.5)
EOSINOPHIL NFR BLD: 0.5 % (ref 0–8)
ERYTHROCYTE [DISTWIDTH] IN BLOOD BY AUTOMATED COUNT: 14.2 % (ref 11.5–14.5)
EST. GFR  (AFRICAN AMERICAN): >60 ML/MIN/1.73 M^2
EST. GFR  (NON AFRICAN AMERICAN): >60 ML/MIN/1.73 M^2
GLUCOSE SERPL-MCNC: 85 MG/DL (ref 70–110)
HCT VFR BLD AUTO: 44.3 % (ref 40–54)
HGB BLD-MCNC: 14.1 G/DL (ref 14–18)
IMM GRANULOCYTES # BLD AUTO: 0.03 K/UL (ref 0–0.04)
IMM GRANULOCYTES NFR BLD AUTO: 0.5 % (ref 0–0.5)
LYMPHOCYTES # BLD AUTO: 1.4 K/UL (ref 1–4.8)
LYMPHOCYTES NFR BLD: 24.3 % (ref 18–48)
MCH RBC QN AUTO: 32.6 PG (ref 27–31)
MCHC RBC AUTO-ENTMCNC: 31.8 G/DL (ref 32–36)
MCV RBC AUTO: 103 FL (ref 82–98)
MONOCYTES # BLD AUTO: 0.6 K/UL (ref 0.3–1)
MONOCYTES NFR BLD: 10.8 % (ref 4–15)
NEUTROPHILS # BLD AUTO: 3.7 K/UL (ref 1.8–7.7)
NEUTROPHILS NFR BLD: 63 % (ref 38–73)
NRBC BLD-RTO: 0 /100 WBC
PLATELET # BLD AUTO: 211 K/UL (ref 150–450)
PMV BLD AUTO: 11 FL (ref 9.2–12.9)
POTASSIUM SERPL-SCNC: 3.9 MMOL/L (ref 3.5–5.1)
RBC # BLD AUTO: 4.32 M/UL (ref 4.6–6.2)
SODIUM SERPL-SCNC: 143 MMOL/L (ref 136–145)
WBC # BLD AUTO: 5.84 K/UL (ref 3.9–12.7)

## 2022-02-17 PROCEDURE — 25000003 PHARM REV CODE 250: Performed by: PHYSICIAN ASSISTANT

## 2022-02-17 PROCEDURE — 11000001 HC ACUTE MED/SURG PRIVATE ROOM

## 2022-02-17 PROCEDURE — 63600175 PHARM REV CODE 636 W HCPCS: Performed by: PHYSICIAN ASSISTANT

## 2022-02-17 PROCEDURE — 96372 THER/PROPH/DIAG INJ SC/IM: CPT | Performed by: PHYSICIAN ASSISTANT

## 2022-02-17 PROCEDURE — 99226 PR SUBSEQUENT OBSERVATION CARE,LEVEL III: CPT | Mod: ,,, | Performed by: PHYSICIAN ASSISTANT

## 2022-02-17 PROCEDURE — 27000221 HC OXYGEN, UP TO 24 HOURS

## 2022-02-17 PROCEDURE — 92507 TX SP LANG VOICE COMM INDIV: CPT

## 2022-02-17 PROCEDURE — 85025 COMPLETE CBC W/AUTO DIFF WBC: CPT | Performed by: PHYSICIAN ASSISTANT

## 2022-02-17 PROCEDURE — 80048 BASIC METABOLIC PNL TOTAL CA: CPT | Performed by: PHYSICIAN ASSISTANT

## 2022-02-17 PROCEDURE — 99226 PR SUBSEQUENT OBSERVATION CARE,LEVEL III: ICD-10-PCS | Mod: ,,, | Performed by: PHYSICIAN ASSISTANT

## 2022-02-17 PROCEDURE — 94761 N-INVAS EAR/PLS OXIMETRY MLT: CPT

## 2022-02-17 PROCEDURE — 36415 COLL VENOUS BLD VENIPUNCTURE: CPT | Performed by: PHYSICIAN ASSISTANT

## 2022-02-17 PROCEDURE — 99900035 HC TECH TIME PER 15 MIN (STAT)

## 2022-02-17 RX ADMIN — ENOXAPARIN SODIUM 40 MG: 100 INJECTION SUBCUTANEOUS at 06:02

## 2022-02-17 RX ADMIN — Medication 6 MG: at 08:02

## 2022-02-17 NOTE — PROGRESS NOTES
[facility] - Telemetry Stepdown ([place] [address])  Otorhinolaryngology-Head & Neck Surgery  Progress Note    Subjective:     Post-Op Info:  Procedure(s) (LRB):  EGD (ESOPHAGOGASTRODUODENOSCOPY) (N/A)   1 Day Post-Op  Hospital Day: 3     Interval History: Breathing well with 4 trach in    Medications:  Continuous Infusions:  Scheduled Meds:   enoxaparin  40 mg Subcutaneous Daily    melatonin  6 mg Oral Nightly     PRN Meds:acetaminophen, albuterol-ipratropium, aluminum-magnesium hydroxide-simethicone, dextrose 10%, dextrose 10%, glucagon (human recombinant), glucose, glucose, naloxone, ondansetron, polyethylene glycol, prochlorperazine, simethicone, sodium chloride 0.9%, sodium chloride 0.9%     Review of patient's allergies indicates:  No Known Allergies  Objective:     Vital Signs (24h Range):  Temp:  [97.5 °F (36.4 °C)-98.2 °F (36.8 °C)] 98.2 °F (36.8 °C)  Pulse:  [68-97] 95  Resp:  [13-22] 20  SpO2:  [91 %-100 %] 95 %  BP: (112-186)/(57-93) 144/80       Lines/Drains/Airways     Airway                 Surgical Airway 02/16/22 1230 Shiley Uncuffed <1 day          Peripheral Intravenous Line                 Peripheral IV - Single Lumen 02/15/22 1145 22 G Left Hand 1 day                Physical Exam  Awake alert  EOMI  Neck - 4-0 cuffless trach in. Stoma serially dilated with lubricated ETTs and 6-0 cuffless trach placed.     Flexible tracheoscopy with normal appearing trachea. No evidence of tracheitis.   Significant Labs:  All pertinent labs from the last 24 hours have been reviewed.    Significant Diagnostics:  I have reviewed and interpreted all pertinent imaging results/findings within the past 24 hours.    Assessment/Plan:     Squamous cell carcinoma of larynx  S/p laryngectomy in 2008, no adjuvant tx. Last follow up in 2013, no further surveillance. Presents with food impaction, EGD wnl. Also with difficulty breathing and large crust within stenotic laryngectomy stoma. Stoma dilated and 6-0 cuffless trahc placed  today.     - Recommend humidified O2  - Recommend saline bullets with RT q4h while awake  - Recommend frequent gentle suctioning with flexible suction catheters  - Recommend SLP consultation for continuing laryngectomy education.  - will change to soft laryngectomy tube tomorrow.   - Pt is a neck breather, and can only be intubated through stoma in case of emergency        Davon Pretty MD  Otorhinolaryngology-Head & Neck Surgery  [facility] - Telemetry Stepdown ([place]

## 2022-02-17 NOTE — NURSING
Escorted patient to Endoscopy for EGD without incident. Patient placed on 8L trach collar. Wife at bedside

## 2022-02-17 NOTE — PLAN OF CARE
Problem: Adult Inpatient Plan of Care  Goal: Absence of Hospital-Acquired Illness or Injury  Outcome: Ongoing, Progressing  Goal: Optimal Comfort and Wellbeing  Outcome: Ongoing, Progressing     Problem: Infection  Goal: Absence of Infection Signs and Symptoms  Outcome: Ongoing, Progressing     Problem: Attention and Thought Clarity Impairment (Delirium)  Goal: Improved Attention and Thought Clarity  Outcome: Ongoing, Progressing      Pt. Asleep in bed,  chest rising and falling, no signs of distress. Trach to wall O2 at 5L, saturations ranging from 93%-98%. VS stable. Wife @bedside. Bed in lowest position, call bell and belongings within reach.

## 2022-02-17 NOTE — ASSESSMENT & PLAN NOTE
Held benzo as not to decrease respiratory drive, now ok to give if needed   - IV ativan prn  - IVFs for volume support  - CIWA  - aspiration precautions   - telemetry   - neuro checks, vital signs Q4H

## 2022-02-17 NOTE — PLAN OF CARE
[facility] - Telemetry Stepdown ([facility])  Discharge Assessment    Primary Care Provider: Primary Doctor No     Discharge Assessment (most recent)     BRIEF DISCHARGE ASSESSMENT - 02/17/22 1123        Discharge Planning    Assessment Type Discharge Planning Brief Assessment     Support Systems Spouse/significant other     Equipment Currently Used at Home none     Current Living Arrangements home/apartment/condo     Patient/Family Anticipates Transition to home with family     Patient/Family Anticipated Services at Transition outpatient care     DME Needed Upon Discharge  other (see comments)   TBD    Discharge Plan A Home with family     Discharge Plan B Home with family;Home Health               Spoke to spouse.  Patient lives with spouse. Post hospital stay spouse will be his support person, patient  And spouse will need transportation at discharge.  There have been no hospitalizations within the last 30 days per spouse. Verified patient's Dr Myrtle Ortega and preferred Pharmacy [facility] in [place].  Spouse states not on Coumadin and is not receiving dialysis.  All questions answered regarding Case Management Discharge Planning, spouse verbalized understanding.      Susy Veronica RN, CM  Case Management  [id number]

## 2022-02-17 NOTE — SUBJECTIVE & OBJECTIVE
Interval History: Breathing well with 4 trach in    Medications:  Continuous Infusions:  Scheduled Meds:   enoxaparin  40 mg Subcutaneous Daily    melatonin  6 mg Oral Nightly     PRN Meds:acetaminophen, albuterol-ipratropium, aluminum-magnesium hydroxide-simethicone, dextrose 10%, dextrose 10%, glucagon (human recombinant), glucose, glucose, naloxone, ondansetron, polyethylene glycol, prochlorperazine, simethicone, sodium chloride 0.9%, sodium chloride 0.9%     Review of patient's allergies indicates:  No Known Allergies  Objective:     Vital Signs (24h Range):  Temp:  [97.5 °F (36.4 °C)-98.2 °F (36.8 °C)] 98.2 °F (36.8 °C)  Pulse:  [68-97] 95  Resp:  [13-22] 20  SpO2:  [91 %-100 %] 95 %  BP: (112-186)/(57-93) 144/80       Lines/Drains/Airways     Airway                 Surgical Airway 02/16/22 1230 Shiley Uncuffed <1 day          Peripheral Intravenous Line                 Peripheral IV - Single Lumen 02/15/22 1145 22 G Left Hand 1 day                Physical Exam  Awake alert  EOMI  Neck - 4-0 cuffless trach in. Stoma serially dilated with lubricated ETTs and 6-0 cuffless trach placed.     Flexible tracheoscopy with normal appearing trachea. No evidence of tracheitis.   Significant Labs:  All pertinent labs from the last 24 hours have been reviewed.    Significant Diagnostics:  I have reviewed and interpreted all pertinent imaging results/findings within the past 24 hours.

## 2022-02-17 NOTE — ASSESSMENT & PLAN NOTE
81 y.o. male with history of history of laryngeal cancer s/p total laryngectomy (2008) and tracheostomy placement (no adjuvant therapy) who presents with feeling of food impaction in esophagus. Had food impaction approx 2 years ago that did not require EGD, he was able to cough it up.      Suspect obstruction of stoma causing symptoms, not food obstruction, see below   - in ED able to tolerate PO liquid and secretions. Maintaining airway  - s/p glucagon at outside ED  - GI consulted, appreciate recs  - EDG 2/16 without impacted food seen in the entirety of the esophagus. Gastritis was present, duodenum normal.   - SLP consulted, awaiting official recs, however will resume soft mechanical diet  - Boost tid per RD recommendation  - ENT consulted. Patient still with FB sensation. Oxygen desaturations. Requiring trach collar with 35% O2 to maintain O2 saturation. Patient has history of removing trach collar.  - Recommend SLP consultation for continuing laryngectomy education.  - ENT will change to soft laryngectomy tube tomorrow.

## 2022-02-17 NOTE — AI DETERIORATION ALERT
"RAPID RESPONSE NURSE AI ALERT       AI alert received.    Chart Reviewed: 02/17/2022, 4:10 PM    MRN: [id number]  Bed: 7071/7071 A    Dx: Tracheal stenosis    Silvio Mayorga has a past medical history of Cancer and Squamous cell carcinoma of larynx.    Last VS: BP (!) 162/79 (BP Location: Right arm, Patient Position: Lying)   Pulse 109   Temp 97.8 °F (36.6 °C) (Oral)   Resp 20   Ht 5' 8.5" (1.74 m)   Wt 53 kg (116 lb 13.5 oz)   SpO2 96%   BMI 17.51 kg/m²     24H Vital Sign Range:  Temp:  [97.5 °F (36.4 °C)-98.2 °F (36.8 °C)]   Pulse:  []   Resp:  [18-20]   BP: (114-167)/(57-84)   SpO2:  [90 %-97 %]     Level of Consciousness (AVPU): alert    Recent Labs     02/15/22  1156 02/16/22  0003   WBC 6.87 7.62   HGB 17.0 15.3   HCT 51.9 47.6    210       Recent Labs     02/15/22  1156 02/16/22  0003    139   K 4.2 5.0   CL 99 102   CO2 27 21*   CREATININE 0.8 0.8   * 100        Recent Labs     02/15/22  2054   PH 7.363   PCO2 42.4   PO2 30*   HCO3 24.1   POCSATURATED 55*   BE -1        OXYGEN:  Flow (L/min): 5  Oxygen Concentration (%): 28  O2 Device (Oxygen Therapy): Trach Collar    MEWS score: 2    Charge RN, Melissa contacted. No concerns verbalized at this time. Instructed to call [phone] for further concerns or assistance.    Belkys Gonzalez RN        "

## 2022-02-17 NOTE — PROGRESS NOTES
"[facility] - Telemetry Stepdown ([facility]-)  Adult Nutrition  Progress Note    SUMMARY       Recommendations    1. Please consult SLP for swallowing difficulties     2. ADAT and medically feasible to Regular, texture per SLP     3. Once diet advanced, add Boost Plus TID    Goals: Pt will receive nutrition by RD f/u  Nutrition Goal Status: new    Communication of RD Recs:  (POC)    Assessment and Plan    Nutrition Problem  Inadequate energy intake    Related to (etiology):   Inability to consume sufficient energy    Signs and Symptoms (as evidenced by):   NPO with no alternate means of nutrition r/t swallowing difficulties    Interventions (treatment strategy):  Collaboration with other providers  General/healthy diet    Nutrition Diagnosis Status:   New    Reason for Assessment    Reason For Assessment: consult (dysphagia)  Diagnosis:  (Food impaction of esophagus)  Relevant Medical History: CA, laryngectomy, alcohol abuse  Interdisciplinary Rounds: did not attend    General Information Comments: Pt presents with what he feels was a food impaction. EGD revealed no indicators of food impaction. Team at bedside and this RD was unable to assess pt's PO intake or appetite PTA. Trach present. NPO since admit. No wt hx available in chart. Unsure of any wt loss. NFPE to be completed upon f/u. HERBERT for malnutrition at this time.    Nutrition Discharge Planning: Pending clinical course    Nutrition Risk Screen    Nutrition Risk Screen: dysphagia or difficulty swallowing    Nutrition/Diet History    Patient Reported Diet/Restrictions/Preferences:  (HERBERT)  Spiritual, Cultural Beliefs, Latter day Practices, Values that Affect Care: no  Food Allergies: NKFA  Factors Affecting Nutritional Intake: NPO,difficulty/impaired swallowing    Anthropometrics    Temp: 98.2 °F (36.8 °C)  Height Method: Stated  Height: 5' 8.5" (174 cm)  Height (inches): 68.5 in  Weight Method: Bed Scale  Weight: 53 kg (116 lb 13.5 oz)  Weight (lb): 116.84 " lb  Ideal Body Weight (IBW), Male: 157 lb  % Ideal Body Weight, Male (lb): 74.42 %  BMI (Calculated): 17.5  BMI Grade: 17 - 18.4 protein-energy malnutrition grade I  Usual Body Weight (UBW), kg:  (HERBERT)       Lab/Procedures/Meds    Pertinent Labs Reviewed: reviewed  Pertinent Labs Comments: labs pending  Pertinent Medications Reviewed: reviewed  Pertinent Medications Comments: no relevant meds    Estimated/Assessed Needs    Weight Used For Calorie Calculations: 52.6 kg (116 lb)  Energy Calorie Requirements (kcal): 4231-1028 kcal/day  Energy Need Method: Kcal/kg (25-30 kcal/kg)  Protein Requirements: 63-74 g/day (1.2-1.4 g/kg; wt gain)  Weight Used For Protein Calculations: 52.6 kg (116 lb)  Fluid Requirements (mL): 1 mL/kcal or per MD  Estimated Fluid Requirement Method: RDA Method  RDA Method (mL): 1315       Nutrition Prescription Ordered    Current Diet Order: NPO    Evaluation of Received Nutrient/Fluid Intake    I/O: +250 mL since admit  Energy Calories Required: not meeting needs  Protein Required: not meeting needs  Comments: LBM 2/15  Tolerance:  (NPO)  % Intake of Estimated Energy Needs: 0 - 25 %  % Meal Intake: NPO    Nutrition Risk    Level of Risk/Frequency of Follow-up: low     Monitor and Evaluation    Food and Nutrient Intake: food and beverage intake,energy intake  Food and Nutrient Adminstration: diet order  Knowledge/Beliefs/Attitudes: food and nutrition knowledge/skill  Physical Activity and Function: nutrition-related ADLs and IADLs  Anthropometric Measurements: weight,weight change  Biochemical Data, Medical Tests and Procedures: electrolyte and renal panel,gastrointestinal profile,glucose/endocrine profile,inflammatory profile,lipid profile  Nutrition-Focused Physical Findings: overall appearance     Nutrition Follow-Up    RD Follow-up?: Yes

## 2022-02-17 NOTE — SUBJECTIVE & OBJECTIVE
Interval History: Pt seen and evaluated at bedside. Pt appears comfortable and w/o acute distress. Spoke with ENT today, who plans for laryngectomy tube exchange tomorrow. SLP evaluated and recommend a soft diet. Discharge likely tomorrow, with HH.     Review of Systems   Unable to perform ROS: Patient nonverbal   HENT: Negative for drooling.    Respiratory: Negative for cough.    Cardiovascular: Negative for chest pain.   Gastrointestinal: Negative for abdominal pain.     Objective:     Vital Signs (Most Recent):  Temp: 97.8 °F (36.6 °C) (02/17/22 1221)  Pulse: 80 (02/17/22 1221)  Resp: 20 (02/17/22 1221)  BP: (!) 162/79 (02/17/22 1221)  SpO2: 97 % (02/17/22 1221) Vital Signs (24h Range):  Temp:  [97.5 °F (36.4 °C)-98.2 °F (36.8 °C)] 97.8 °F (36.6 °C)  Pulse:  [68-97] 80  Resp:  [18-20] 20  SpO2:  [91 %-97 %] 97 %  BP: (114-167)/(57-84) 162/79     Weight: 53 kg (116 lb 13.5 oz)  Body mass index is 17.51 kg/m².  No intake or output data in the 24 hours ending 02/17/22 1401   Physical Exam  Vitals and nursing note reviewed.   Constitutional:       General: He is not in acute distress.     Appearance: He is well-developed.      Comments: No acute distress. Appears comfortable.    HENT:      Head: Normocephalic and atraumatic.      Mouth/Throat:      Pharynx: No oropharyngeal exudate.   Eyes:      Conjunctiva/sclera: Conjunctivae normal.      Pupils: Pupils are equal, round, and reactive to light.   Neck:      Trachea: Tracheostomy present.   Cardiovascular:      Rate and Rhythm: Normal rate and regular rhythm.      Heart sounds: Normal heart sounds.   Pulmonary:      Effort: Pulmonary effort is normal. No respiratory distress.      Breath sounds: Normal breath sounds. No wheezing.   Abdominal:      General: Bowel sounds are normal. There is no distension.      Palpations: Abdomen is soft.      Tenderness: There is no abdominal tenderness.   Musculoskeletal:         General: No tenderness. Normal range of motion.       Cervical back: Normal range of motion and neck supple.   Skin:     General: Skin is warm and dry.      Capillary Refill: Capillary refill takes less than 2 seconds.      Findings: No rash.   Neurological:      Mental Status: He is alert and oriented to person, place, and time.      Cranial Nerves: No cranial nerve deficit.      Sensory: No sensory deficit.      Coordination: Coordination normal.   Psychiatric:      Comments: Nonverbal, points for communication         Significant Labs: All pertinent labs within the past 24 hours have been reviewed.    Significant Imaging: I have reviewed all pertinent imaging results/findings within the past 24 hours.

## 2022-02-17 NOTE — PLAN OF CARE
Recommendations    1. Please consult SLP for swallowing difficulties     2. ADAT and medically feasible to Regular, texture per SLP     3. Once diet advanced, add Boost Plus TID    Goals: Pt will receive nutrition by RD f/u  Nutrition Goal Status: new    Communication of RD Recs:  (POC)    Leana Mays MS, RD, LDN  Ext: [phone]

## 2022-02-17 NOTE — PT/OT/SLP EVAL
[facility] - Telemetry Stepdown ([facility]-)    Post-Operative Total Laryngectomy Evaluation    Patient Name:  Silvio Mayorga   MRN:  [id number]  Admitting Diagnosis: Tracheal stenosis    PATIENT IS A NECK-BREATHER - DO NOT ORALLY INTUBATE    Recommendations:                 General Recommendations:  out patient ENT and speech follow up   Diet recommendations: Pt able to resume baseline diet per medical team  General Precautions: Standard,      Communication:      Communication strategies: Communication board, Provide increased time to answer, Go to room if call light pushed and Whiteboard/Paper-pencil provided   Patient communicating all wants and needs? yes    History:     Past Medical History:   Diagnosis Date    Cancer     Squamous cell carcinoma of larynx        Past Surgical History:   Procedure Laterality Date    ESOPHAGOGASTRODUODENOSCOPY N/A 2/16/2022    Procedure: EGD (ESOPHAGOGASTRODUODENOSCOPY);  Surgeon: Heber Miller MD;  Location: [address];  Service: Endoscopy;  Laterality: N/A;    laryngectomy      TRACHEOSTOMY         Date of Surgery: laryngectomy completed 2008   PEG placed at time of surgery?  No   TEP placed at time of surgery?  No          HPI:  Silvio Mayorga is a 81 y.o. male with a PMHx of laryngeal cancer s/p total laryngectomy (2008) and tracheostomy placement (no adjuvant therapy) who presented to the ED with the feeling of a food impaction in esophagus. He swallowed a piece of pork last night when he felt it get stuck. This morning he was able to cough up a small piece of the food, but it still feels like something is in his throat. He went to the ED at [facility] and was transferred here for GI eval. Approximately 2 years ago he had a food impaction but was able to cough it up and has never required endoscopy for removal. He is able to tolerate liquids, was witnessed drinking water in ED. He is swallowing his saliva. No airway compromise and he denies SOB, abdominal  pain, N/V, and chest pain.      He received glucagon at outside facility. Patient was able to swallow half a cup of water in ED and felt it go down.     ED: Afebrile, /102, , on trach collar. CBC, CMP without acute abnormalities. CXR without acute process.        Overview/Hospital Course:  81 y.o male admitted for GI evaluation for possible food impaction. Maintained airway and tolerated secretions and water. EGD done 12/16 showing no food bolus or evidence of recent impaction seen in the entire esophagus. Gastritis present and normal examined duodenum. Was cleared by GI for discharge home on chopped-up diet but had frequent oxygen saturations in 70-80's. Improved to 90s with 35% trach collar. RT evaluated, difficulty with suctioning tracheostomy site. ENT was consulted and found dry crust occupying about 90% of the stoma, which was cleared with improvement of his respiratory status. Patient to discharge home with  services when medically stable.       ENT Evaluation:   S/p laryngectomy in 2008, no adjuvant tx. Last follow up in 2013, no further surveillance. Presents with food impaction, EGD wnl. Also with difficulty breathing and large crust within stenotic laryngectomy stoma. Stoma dilated and 6-0 cuffless trahc placed today.      - Recommend humidified O2  - Recommend saline bullets with RT q4h while awake  - Recommend frequent gentle suctioning with flexible suction catheters  - Recommend SLP consultation for continuing laryngectomy education.  - will change to soft laryngectomy tube tomorrow.   - Pt is a neck breather, and can only be intubated through stoma in case of emergency        Subjective:     Pt asleep upon arrival, family member and RN at the bedside     Pain/Comfort:  · Pain Rating 1: 0/10  · Pain Rating Post-Intervention 1: 0/10    Respiratory Status: trach collar     Objective:     Oral Musculature Evaluation:  · Oral Musculature: WFL  · Dentition: edentulous  · Oral Labial Strength  and Mobility: WFL  · Lingual Strength and Mobility: WFL  · Voice Prior to PO Intake: unable to assess- laryngectomy    Alaryngeal communication:  · Writing and Gestures/Facial Expressions primary method of communication at baseline     Stoma Observations:  · Excess Secretions  thick tracheal secretions appreciated     Equipment in Stoma:  · Trach Tube  Cuffless       Pt seen for assess for aspiration risk at the request of primary medical team. Pt recently underwent EGD which was unremarkable. ENT involved and monitoring for airway patency. Pt and family member report pt typically struggles with tough meats. Pt edentulous and SLP suggested avoiding harder more difficult to chew foods as pt likely not thoroughly chewing enough given absent dentition. Pt recognized he does best with softer foods. Team concerned about pt right lower lobe infiltrates from a swallowing perspective and SLP discussed given the nature of his post operative state with a laryngecotmy completed 10+ years ago, unlikely pt aspirating during swallow. Pt significantly low risk for aspiration with eating or drinking.  Should team continue to be concerned about RLL pneumonia would suggest further follow up with medical team to rule out any additional anatomic changes postoperatively (scuh as a TE fistula etc.) Also, Pt would benefit from ongoing laryngectomy life-style education such as avoiding swimming etc. To best protect airway.       Education:     General Post-Operative Education Provided:  · SLP discussed:   Anatomical changes that will occur in respiration, communication, coughing, sneezing, blowing nose, and swallowing   · Patient will be a total neck breather and that in the event of emergency, oxygen must be provided to stoma   · Expectations for follow up and discharge   · Lifestyle changes, including importance of avoiding water to stoma provided.  · Ongoing education and support warranted.    Alternate Airway Precautions in  Place:  · All laryngectomy precautions present: Orange signs above HOB and on door, communication impaired wristband, Pediatric ambu bag mask (size 3)  ·  notified patient has laryngectomy and to call RN immediately when patient presses call bell.  · RN notified to enter room immediately when called to attend patient.    Impressions:  · Patient would benefit from ongoing education/support of  Post-operative anatomy, physical and functional limitations  · The ability to perform Stoma care.    Plan:     · Patient to be seen:      · Plan of Care expires:     · Plan of Care reviewed with:  patient   · SLP Follow-Up:  No       Discharge recommendations:  home   Barriers to Discharge:  None    Time Tracking:     SLP Treatment Date:   02/17/22  Speech Start Time:  0750  Speech Stop Time:  0802     Speech Total Time (min):  12 min    Billable Minutes: Ember 12     02/17/2022

## 2022-02-17 NOTE — ASSESSMENT & PLAN NOTE
S/p laryngectomy in 2008, no adjuvant tx. Last follow up in 2013, no further surveillance. Presents with food impaction, EGD wnl. Also with difficulty breathing and large crust within stenotic laryngectomy stoma. Stoma dilated and 6-0 cuffless trahc placed today.     - Recommend humidified O2  - Recommend saline bullets with RT q4h while awake  - Recommend frequent gentle suctioning with flexible suction catheters  - Recommend SLP consultation for continuing laryngectomy education.  - will change to soft laryngectomy tube tomorrow.   - Pt is a neck breather, and can only be intubated through stoma in case of emergency

## 2022-02-17 NOTE — NURSING
Entered pt. Room. Patient appeared confused. trach collar was not in place. Pt oxygen saturation in 70's. placed trach collar over laryngeal stoma and increased oxygen to 8L to bring patient to 92%. Called rt

## 2022-02-17 NOTE — CARE UPDATE
RAPID RESPONSE NURSE ROUND       Rounding completed with charge RN, Melissa. No concerns verbalized at this time. Instructed to call [phone] for further concerns or assistance.

## 2022-02-17 NOTE — PROGRESS NOTES
[facility] - Telemetry Stepdown ([place] [address])  [facility]  Progress Note    Patient Name: Silvio Mayorga  MRN: [id number]  Patient Class: OP- Observation   Admission Date: 2/15/2022  Length of Stay: 0 days  Attending Physician: Dia Castro MD  Primary Care Provider: Primary Doctor No        Subjective:     Principal Problem:Tracheal stenosis        HPI:  Silvio Mayorga is a 81 y.o. male with a PMHx of laryngeal cancer s/p total laryngectomy (2008) and tracheostomy placement (no adjuvant therapy) who presented to the ED with the feeling of a food impaction in esophagus. He swallowed a piece of pork last night when he felt it get stuck. This morning he was able to cough up a small piece of the food, but it still feels like something is in his throat. He went to the ED at [facility] and was transferred here for GI eval. Approximately 2 years ago he had a food impaction but was able to cough it up and has never required endoscopy for removal. He is able to tolerate liquids, was witnessed drinking water in ED. He is swallowing his saliva. No airway compromise and he denies SOB, abdominal pain, N/V, and chest pain.      He received glucagon at outside facility. Patient was able to swallow half a cup of water in ED and felt it go down.     ED: Afebrile, /102, , on trach collar. CBC, CMP without acute abnormalities. CXR without acute process.      Overview/Hospital Course:  81 y.o male admitted for GI evaluation for possible food impaction. Maintained airway and tolerated secretions and water. EGD done 12/16 showing no food bolus or evidence of recent impaction seen in the entire esophagus. Gastritis present and normal examined duodenum. Was cleared by GI for discharge home on chopped-up diet but had frequent oxygen saturations in 70-80's. Improved to 90s with 35% trach collar. RT evaluated, difficulty with suctioning tracheostomy site. ENT was consulted and found dry crust occupying about 90% of  the stoma, which was cleared with improvement of his respiratory status. Patient to discharge home with HH services when medically stable.       Interval History: Pt seen and evaluated at bedside. Pt appears comfortable and w/o acute distress. Spoke with ENT today, who plans for laryngectomy tube exchange tomorrow. SLP evaluated and recommend a soft diet. Discharge likely tomorrow, with HH.     Review of Systems   Unable to perform ROS: Patient nonverbal   HENT: Negative for drooling.    Respiratory: Negative for cough.    Cardiovascular: Negative for chest pain.   Gastrointestinal: Negative for abdominal pain.     Objective:     Vital Signs (Most Recent):  Temp: 97.8 °F (36.6 °C) (02/17/22 1221)  Pulse: 80 (02/17/22 1221)  Resp: 20 (02/17/22 1221)  BP: (!) 162/79 (02/17/22 1221)  SpO2: 97 % (02/17/22 1221) Vital Signs (24h Range):  Temp:  [97.5 °F (36.4 °C)-98.2 °F (36.8 °C)] 97.8 °F (36.6 °C)  Pulse:  [68-97] 80  Resp:  [18-20] 20  SpO2:  [91 %-97 %] 97 %  BP: (114-167)/(57-84) 162/79     Weight: 53 kg (116 lb 13.5 oz)  Body mass index is 17.51 kg/m².  No intake or output data in the 24 hours ending 02/17/22 1401   Physical Exam  Vitals and nursing note reviewed.   Constitutional:       General: He is not in acute distress.     Appearance: He is well-developed.      Comments: No acute distress. Appears comfortable.    HENT:      Head: Normocephalic and atraumatic.      Mouth/Throat:      Pharynx: No oropharyngeal exudate.   Eyes:      Conjunctiva/sclera: Conjunctivae normal.      Pupils: Pupils are equal, round, and reactive to light.   Neck:      Trachea: Tracheostomy present.   Cardiovascular:      Rate and Rhythm: Normal rate and regular rhythm.      Heart sounds: Normal heart sounds.   Pulmonary:      Effort: Pulmonary effort is normal. No respiratory distress.      Breath sounds: Normal breath sounds. No wheezing.   Abdominal:      General: Bowel sounds are normal. There is no distension.      Palpations:  Abdomen is soft.      Tenderness: There is no abdominal tenderness.   Musculoskeletal:         General: No tenderness. Normal range of motion.      Cervical back: Normal range of motion and neck supple.   Skin:     General: Skin is warm and dry.      Capillary Refill: Capillary refill takes less than 2 seconds.      Findings: No rash.   Neurological:      Mental Status: He is alert and oriented to person, place, and time.      Cranial Nerves: No cranial nerve deficit.      Sensory: No sensory deficit.      Coordination: Coordination normal.   Psychiatric:      Comments: Nonverbal, points for communication         Significant Labs: All pertinent labs within the past 24 hours have been reviewed.    Significant Imaging: I have reviewed all pertinent imaging results/findings within the past 24 hours.      Assessment/Plan:      * Tracheal stenosis  81 y.o. male with history of history of laryngeal cancer s/p total laryngectomy (2008) and tracheostomy placement (no adjuvant therapy) who presents with feeling of food impaction in esophagus. Had food impaction approx 2 years ago that did not require EGD, he was able to cough it up.      Suspect obstruction of stoma causing symptoms, not food obstruction, see below   - in ED able to tolerate PO liquid and secretions. Maintaining airway  - s/p glucagon at outside ED  - GI consulted, appreciate recs  - EDG 2/16 without impacted food seen in the entirety of the esophagus. Gastritis was present, duodenum normal.   - SLP consulted, awaiting official recs, however will resume soft mechanical diet  - Boost tid per RD recommendation  - ENT consulted. Patient still with FB sensation. Oxygen desaturations. Requiring trach collar with 35% O2 to maintain O2 saturation. Patient has history of removing trach collar.  - Recommend SLP consultation for continuing laryngectomy education.  - ENT will change to soft laryngectomy tube tomorrow.     Alcohol abuse  Held benzo as not to decrease  respiratory drive, now ok to give if needed   - IV ativan prn  - IVFs for volume support  - CIWA  - aspiration precautions   - telemetry   - neuro checks, vital signs Q4H    Squamous cell carcinoma of larynx  s/p laryngectomy  tracheostomy in place    s/p laryngectomy in 2008, no adjuvant tx. Last follow up in 2013, no further surveillance.    - respiratory failure noted following EGD, likely secondary to stoma obstruction   - ENT consulted   - Large crust removed from stoma with instant improvement in breathing  - 4-0 cuffless Shiley trach placed into stoma with moderate resistance. Will attempt to upsize to 6-0 and eventually place laryngectomy tube. Pt may ultimately need stoma revision.   - Recommend humidified O2  - Recommend saline bullets with RT q4h while awake  - Recommend frequent gentle suctioning with flexible suction catheters  - Recommend SLP consultation  - Please keep 4-0 and 6-0 inner cannulas at bedside  - Please bring 6-0 cuffless Shiley to bedside (may need to request from RT)  - Obturator taped to headboard  - Pt is a neck breather, and can only be intubated through stoma in case of emergency      VTE Risk Mitigation (From admission, onward)         Ordered     enoxaparin injection 40 mg  Daily         02/15/22 2231     IP VTE HIGH RISK PATIENT  Once         02/15/22 2231     Place sequential compression device  Until discontinued         02/15/22 2231                Discharge Planning   WINIFRED: 2/19/2022     Code Status: Full Code   Is the patient medically ready for discharge?: No    Reason for patient still in hospital (select all that apply): Patient trending condition, Treatment and Consult recommendations  Discharge Plan A: Home with family                  Mami Mayorga PA-C  Department of Hospital Medicine   [facility] - Telemetry Stepdown ([place]

## 2022-02-18 ENCOUNTER — TELEPHONE (OUTPATIENT)
Dept: INTERNAL MEDICINE | Facility: CLINIC | Age: 82
End: 2022-02-18
Payer: MEDICARE

## 2022-02-18 VITALS
OXYGEN SATURATION: 98 % | BODY MASS INDEX: 17.31 KG/M2 | SYSTOLIC BLOOD PRESSURE: 96 MMHG | WEIGHT: 116.88 LBS | HEIGHT: 69 IN | RESPIRATION RATE: 13 BRPM | DIASTOLIC BLOOD PRESSURE: 55 MMHG | TEMPERATURE: 98 F | HEART RATE: 96 BPM

## 2022-02-18 PROCEDURE — 99239 PR HOSPITAL DISCHARGE DAY,>30 MIN: ICD-10-PCS | Mod: ,,, | Performed by: PHYSICIAN ASSISTANT

## 2022-02-18 PROCEDURE — 99900035 HC TECH TIME PER 15 MIN (STAT)

## 2022-02-18 PROCEDURE — 25000003 PHARM REV CODE 250: Performed by: PHYSICIAN ASSISTANT

## 2022-02-18 PROCEDURE — 94761 N-INVAS EAR/PLS OXIMETRY MLT: CPT

## 2022-02-18 PROCEDURE — 99239 HOSP IP/OBS DSCHRG MGMT >30: CPT | Mod: ,,, | Performed by: PHYSICIAN ASSISTANT

## 2022-02-18 PROCEDURE — 1111F DSCHRG MED/CURRENT MED MERGE: CPT | Mod: CPTII,,, | Performed by: PHYSICIAN ASSISTANT

## 2022-02-18 PROCEDURE — 97535 SELF CARE MNGMENT TRAINING: CPT

## 2022-02-18 PROCEDURE — 27000221 HC OXYGEN, UP TO 24 HOURS

## 2022-02-18 PROCEDURE — 1111F PR DISCHARGE MEDS RECONCILED W/ CURRENT OUTPATIENT MED LIST: ICD-10-PCS | Mod: CPTII,,, | Performed by: PHYSICIAN ASSISTANT

## 2022-02-18 PROCEDURE — 92507 TX SP LANG VOICE COMM INDIV: CPT

## 2022-02-18 RX ADMIN — Medication 6 MG: at 09:02

## 2022-02-18 NOTE — ASSESSMENT & PLAN NOTE
S/p laryngectomy in 2008, no adjuvant tx. Last follow up in 2013, no further surveillance. Presents with food impaction, EGD wnl. Also with difficulty breathing and large crust within stenotic laryngectomy stoma. Stoma dilated and 6-0 cuffless trahc placed today.     - Recommend humidified O2  - Recommend saline bullets with RT q4h while awake  - Recommend frequent gentle suctioning with flexible suction catheters  - Can replace laryngectomy tube if it fall out, may need lubrication  - Pt instructed to keep laryngectomy tube in place at all times  - Recommend sending home w extra neck ties  - Recommend SLP consultation for continuing laryngectomy education  - Okay for discharge from ENT perspective, rest of care per primary team  - Pt is a neck breather, and can only be intubated through stoma in case of emergency

## 2022-02-18 NOTE — PLAN OF CARE
Problem: Adult Inpatient Plan of Care  Goal: Plan of Care Review  Outcome: Ongoing, Progressing  Goal: Absence of Hospital-Acquired Illness or Injury  Outcome: Ongoing, Progressing  Goal: Optimal Comfort and Wellbeing  Outcome: Ongoing, Progressing     Problem: Infection  Goal: Absence of Infection Signs and Symptoms  Outcome: Ongoing, Progressing     Problem: Attention and Thought Clarity Impairment (Delirium)  Goal: Improved Attention and Thought Clarity  Outcome: Ongoing, Progressing     Problem: Sleep Disturbance (Delirium)  Goal: Improved Sleep  Outcome: Ongoing, Progressing     Problem: Fall Injury Risk  Goal: Absence of Fall and Fall-Related Injury  Outcome: Ongoing, Progressing     Problem: Swallowing Impairment  Goal: Optimal Eating and Swallowing Without Aspiration  Outcome: Ongoing, Progressing     Pt. Asleep in bed, NAD. Wife at bedside. % o2 saturation per trach to 5L wall o2. No complaints of pain or discomfort over 12 course. Pt. Refusing hygiene care assistance. Cardiac and continuous pulse oximetry monitoring maintained. Bed in lowest position, call bell and belongings within reach. Pt. Aware to call for assistance upon ambulation.

## 2022-02-18 NOTE — PLAN OF CARE
02/18/22 1156   Post-Acute Status   Post-Acute Authorization Home Health   Home Health Status Referrals Sent   Discharge Delays None known at this time   Discharge Plan   Discharge Plan A Home with family;Home Health   Discharge Plan B Home with family;Home Health   Home Health Referrals sent out to:  [facility] [phone]  [facility] [phone]  Home [facility] [phone]  [facility] [phone]  [facility] [phone]  [facility] [place] [phone]      Susy Veronica, RN, CM  Case Management  [id number]

## 2022-02-18 NOTE — PLAN OF CARE
[facility] - Telemetry Stepdown ([place] [address])      HOME HEALTH ORDERS  FACE TO FACE ENCOUNTER    Patient Name: Silvio Mayorga  YOB: 1940    PCP: Primary Doctor No   PCP Address: None  PCP Phone Number: None  PCP Fax: None    Encounter Date: 2/15/22    Admit to Home Health    Diagnoses:  Active Hospital Problems    Diagnosis  POA    *Tracheal stenosis [J39.8]  Yes    Alcohol abuse [F10.10]  Yes    Status post laryngectomy [Z90.02]  Not Applicable    Squamous cell carcinoma of larynx [C32.9]  Yes      Resolved Hospital Problems    Diagnosis Date Resolved POA    Tracheostomy in place [Z93.0] 02/16/2022 Not Applicable       Follow Up Appointments:  Future Appointments   Date Time Provider Department Center   2/25/2022  1:15 PM Jeovany Farrell DO [facility]       Allergies:Review of patient's allergies indicates:  No Known Allergies    Medications: Review discharge medications with patient and family and provide education.    Current Facility-Administered Medications   Medication Dose Route Frequency Provider Last Rate Last Admin    acetaminophen tablet 650 mg  650 mg Oral Q4H PRN Christine Pedroza PA-C        albuterol-ipratropium 2.5 mg-0.5 mg/3 mL nebulizer solution 3 mL  3 mL Nebulization Q4H PRN Christine Pedroza PA-C        aluminum-magnesium hydroxide-simethicone 200-200-20 mg/5 mL suspension 30 mL  30 mL Oral QID PRN Christine Pedroza PA-C        dextrose 10% bolus 125 mL  12.5 g Intravenous PRN Christine Pedroza PA-C        dextrose 10% bolus 250 mL  25 g Intravenous PRN Christine Pedroza PA-C        enoxaparin injection 40 mg  40 mg Subcutaneous Daily Christine Pedroza PA-C   40 mg at 02/17/22 1811    glucagon (human recombinant) injection 1 mg  1 mg Intramuscular PRN Christine Pedroza PA-C        glucose chewable tablet 16 g  16 g Oral PRN Christine Pedroza PA-C        glucose chewable tablet 24 g  24 g Oral PRN Christine Pedroza PA-C         melatonin tablet 6 mg  6 mg Oral Nightly Christine Pedroza PA-C   6 mg at 02/17/22 2018    naloxone 0.4 mg/mL injection 0.02 mg  0.02 mg Intravenous PRN Christine Pedroza PA-C        ondansetron disintegrating tablet 8 mg  8 mg Oral Q8H PRN Christine Pedroza PA-C        polyethylene glycol packet 17 g  17 g Oral Daily PRN Christine Pedroza PA-C        prochlorperazine injection Soln 5 mg  5 mg Intravenous Q6H PRN Christine Pedroza PA-C        simethicone chewable tablet 80 mg  1 tablet Oral QID PRN Christine Pedroza PA-C        sodium chloride 0.9% flush 10 mL  10 mL Intravenous Q8H PRN Christine Pedroza PA-C        sodium chloride 0.9% flush 10 mL  10 mL Intravenous PRN Sherman Ivan III, MD         There are no discharge medications for this patient.        I have seen and examined this patient within the last 30 days. My clinical findings that support the need for the home health skilled services and home bound status are the following:no   Patient with medication mismanagement issues requiring home bound status as evidenced by  Poor understanding of medication regimen/dosage and Poor adherence to medication regimen/dosage.     Diet:   soft diet, chopped diet    Labs:  Report Lab results to PCP.    Referrals/ Consults  Speech Therapy  to evaluate and treat for  Swallowing.  Aide to provide assistance with personal care, ADLs, and vital signs.    Activities:   activity as tolerated    Nursing:   Agency to admit patient within 24 hours of hospital discharge unless specified on physician order or at patient request    SN to complete comprehensive assessment including routine vital signs. Instruct on disease process and s/s of complications to report to MD. Review/verify medication list sent home with the patient at time of discharge  and instruct patient/caregiver as needed. Frequency may be adjusted depending on start of care date.     Skilled nurse to perform up to 3 visits PRN for  symptoms related to diagnosis    Notify MD if SBP > 160 or < 90; DBP > 90 or < 50; HR > 120 or < 50; Temp > 101; O2 < 88%;    Ok to schedule additional visits based on staff availability and patient request on consecutive days within the home health episode.    When multiple disciplines ordered:    Start of Care occurs on Sunday - Wednesday schedule remaining discipline evaluations as ordered on separate consecutive days following the start of care.    Thursday SOC -schedule subsequent evaluations Friday and Monday the following week.     Friday - Saturday SOC - schedule subsequent discipline evaluations on consecutive days starting Monday of the following week.    For all post-discharge communication and subsequent orders please contact patient's primary care physician. If unable to reach primary care physician or do not receive response within 30 minutes, please contact [phone] for clinical staff order clarification    Miscellaneous   - Recommend humidified O2  - Recommend saline bullets with RT q4h while awake  - Recommend frequent gentle suctioning with flexible suction catheters  - Can replace laryngectomy tube if it fall out, may need lubrication  - Pt instructed to keep laryngectomy tube in place at all times  - Recommend sending home w extra neck ties        I certify that this patient is confined to his home and needs intermittent skilled nursing care and speech therapy.

## 2022-02-18 NOTE — SUBJECTIVE & OBJECTIVE
Interval History: NAEON. 6-0 cuffless placed yesteryda after serial dilation. Doing well, no new issues.     Medications:  Continuous Infusions:  Scheduled Meds:   enoxaparin  40 mg Subcutaneous Daily    melatonin  6 mg Oral Nightly     PRN Meds:acetaminophen, albuterol-ipratropium, aluminum-magnesium hydroxide-simethicone, dextrose 10%, dextrose 10%, glucagon (human recombinant), glucose, glucose, naloxone, ondansetron, polyethylene glycol, prochlorperazine, simethicone, sodium chloride 0.9%, sodium chloride 0.9%     Review of patient's allergies indicates:  No Known Allergies  Objective:     Vital Signs (24h Range):  Temp:  [97.4 °F (36.3 °C)-98.5 °F (36.9 °C)] 97.4 °F (36.3 °C)  Pulse:  [] 89  Resp:  [17-20] 17  SpO2:  [90 %-100 %] 98 %  BP: ()/(53-80) 142/69       Lines/Drains/Airways     Airway                 Surgical Airway 02/17/22 0930 Shiley Uncuffed <1 day          Peripheral Intravenous Line                 Peripheral IV - Single Lumen 02/15/22 1145 22 G Left Hand 2 days                Physical Exam   Awake alert  EOMI  Neck - 6-0 cuffless trach in. Stoma examined, clean, and laryngectomy plastic tube placed    Significant Labs:  BMP:   Recent Labs   Lab 02/17/22  2256   GLU 85      CO2 26   BUN 23   CREATININE 0.9   CALCIUM 9.6     CBC:   Recent Labs   Lab 02/17/22  2256   WBC 5.84   RBC 4.32*   HGB 14.1   HCT 44.3      *   MCH 32.6*   MCHC 31.8*       Significant Diagnostics:  I have reviewed and interpreted all pertinent imaging results/findings within the past 24 hours.

## 2022-02-18 NOTE — DISCHARGE INSTRUCTIONS
ENT Clinic will be contacting the patient today to schedule a follow up appointment with in a week.  The clinic will also contact the patient's  to give the appointment verification date and time.

## 2022-02-18 NOTE — TELEPHONE ENCOUNTER
----- Message from Maribeth Devi sent at 2/18/2022  9:23 AM CST -----  Contact: [phone]  Pt has a trace amount of blood when she blows her nose. She would like a referral to ENT to discuss. Please advise.

## 2022-02-18 NOTE — NURSING
Removed iv intact patient tolerated well. Patient given discharge instructions and all of his belongings. wife is at bedside. Will request transportor to wheelchair patient to exit door.

## 2022-02-18 NOTE — CARE UPDATE
RAPID RESPONSE NURSE ROUND       Rounding completed with charge RN, Melissa. No concerns verbalized at this time. Instructed to call [phone] for further concerns or assistance.

## 2022-02-18 NOTE — PT/OT/SLP PROGRESS
[facility] - Telemetry Stepdown ([place] [address])  Total Laryngectomy Treatment Note    Patient Name:  Silvio Mayorga   MRN:  [id number]  Admitting Diagnosis: Tracheal stenosis    PATIENT IS A NECK-BREATHER - DO NOT ORALLY INTUBATE    Recommendations:                 General Recommendations:  Speech/language therapy  Diet recommendations:  Patient NPO until cleared by ENT  Aspiration Precautions: Standard aspiration precautions  General Precautions: Standard,      Communication:      Communication strategies: Communication board, Provide increased time to answer, Go to room if call light pushed and Whiteboard/Paper-pencil provided   Patient communicating all wants and needs? yes    History:     Past Medical History:   Diagnosis Date    Cancer     Squamous cell carcinoma of larynx        Past Surgical History:   Procedure Laterality Date    ESOPHAGOGASTRODUODENOSCOPY N/A 2/16/2022    Procedure: EGD (ESOPHAGOGASTRODUODENOSCOPY);  Surgeon: Heber Miller MD;  Location: [address];  Service: Endoscopy;  Laterality: N/A;    laryngectomy      TRACHEOSTOMY         Date of Surgery: 2008    PEG placed at time of surgery?  no  TEP placed at time of surgery?  no    Subjective:     Pt awake and spouse at the bedside   Patient Goals:  Return home     Pain/Comfort:  · Pain Rating 1: 0/10  · Pain Rating Post-Intervention 1: 0/10    Respiratory Status: Room air    Objective:     Communication:  · Gestures/Facial Expressions  · Mouthing Words    Additional Treatment:  Trach since replaced by ENT with a tacho tube. Pt now with tacho tube in place with HME. SLP offered pt and spouse extensive education re: stoma care. SLP spoke through frequency of performing stoma care and cleaning tacho tube and demonstrated all necessary steps.     Education:     General Post-Operative Education Provided:  · SLP discussed:   Educated on stoma care and how to perform   · Use and benefits of heat and moisture exchanges (HMEs) to provide  humidity to stoma   · Expectations for follow up and discharge   · importance of routine stoma care  · Patient engaged in education and demonstrating adequate understanding of all topics reviewed.  · patient and family demonstrated understanding.  · patient and family demonstrated ability to verbally teachback education principals reviewed.  · Ongoing education and support warranted.    Alternate Airway Precautions in Place:  · All laryngectomy precautions present: Orange signs above HOB and on door, communication impaired wristband, Pediatric ambu bag mask (size 3)  · RN notified to enter room immediately when called to attend patient.    Impressions:  · Patient would benefit from ongoing education/support of  Post-operative anatomy, physical and functional limitations  · The ability to perform Stoma care.      SLP spoke with team directly pt has not demonstrated independence with stoma care at this time and given condition of trachostoma at time of admission concern for ability to independently manage. Team reports home health RN will attend home to ensure laryngectomy supplies present and pt ability to complete stoma care routinely.     Plan:     · Patient to be seen:  3 x/week   · Plan of Care expires:     · Plan of Care reviewed with:  patient   · SLP Follow-Up:  Yes       Discharge recommendations:  outpatient speech therapy   Barriers to Discharge:  None    Time Tracking:     SLP Treatment Date:   02/18/22  Speech Start Time:  1050  Speech Stop Time:  1119     Speech Total Time (min):  29 min    Billable Minutes: Treatment Swallowing Dysfunction 16 and Self Care/Home Management Training 15    02/18/2022

## 2022-02-18 NOTE — PLAN OF CARE
02/18/22 1239   Post-Acute Status   Post-Acute Authorization Home Health   Home Health Status Set-up Complete/Auth obtained   Discharge Delays None known at this time   Discharge Plan   Discharge Plan A Home with family;Home Health   Discharge Plan B Home with family;Home Health   The Medical Team has accepted the patient for his Home health needs.    Susy Veronica RN, CM  Case Management  [id number]

## 2022-02-18 NOTE — ASSESSMENT & PLAN NOTE
81 y.o. male with history of history of laryngeal cancer s/p total laryngectomy (2008) and tracheostomy placement (no adjuvant therapy) who presents with feeling of food impaction in esophagus. Had food impaction approx 2 years ago that did not require EGD, he was able to cough it up.      Suspect obstruction of stoma causing symptoms, not food obstruction, see below   - in ED able to tolerate PO liquid and secretions. Maintaining airway  - s/p glucagon at outside ED  - GI consulted, appreciate recs  - EDG 2/16 without impacted food seen in the entirety of the esophagus. Gastritis was present, duodenum normal.   - SLP consulted, awaiting official recs, however will resume soft mechanical diet  - Boost tid per RD recommendation  - ENT consulted. Patient still with FB sensation. Oxygen desaturations. Requiring trach collar with 35% O2 to maintain O2 saturation. Patient has history of removing trach collar.  - Recommend SLP consultation for continuing laryngectomy education.  - ENT changed to soft laryngectomy tube.

## 2022-02-18 NOTE — DISCHARGE SUMMARY
[facility] [address]  [facility] Medicine  Discharge Summary      Patient Name: Silvio Mayorga  MRN: [id number]  Patient Class: IP- Inpatient  Admission Date: 2/15/2022  Hospital Length of Stay: 1 days  Discharge Date and Time: 2/18/2022 10:58 PM  Attending Physician: Dia Castro MD   Discharging Provider: Mami Mayorga PA-C  Primary Care Provider: Primary Doctor [facility] Medicine Team: [facility] MED Y Mami Mayorga PA-C    HPI:   Silvio Mayorga is a 81 y.o. male with a PMHx of laryngeal cancer s/p total laryngectomy (2008) and tracheostomy placement (no adjuvant therapy) who presented to the ED with the feeling of a food impaction in esophagus. He swallowed a piece of pork last night when he felt it get stuck. This morning he was able to cough up a small piece of the food, but it still feels like something is in his throat. He went to the ED at [facility] and was transferred here for GI eval. Approximately 2 years ago he had a food impaction but was able to cough it up and has never required endoscopy for removal. He is able to tolerate liquids, was witnessed drinking water in ED. He is swallowing his saliva. No airway compromise and he denies SOB, abdominal pain, N/V, and chest pain.      He received glucagon at outside facility. Patient was able to swallow half a cup of water in ED and felt it go down.     ED: Afebrile, /102, , on trach collar. CBC, CMP without acute abnormalities. CXR without acute process.      Procedure(s) (LRB):  EGD (ESOPHAGOGASTRODUODENOSCOPY) (N/A)      Hospital Course:   81 y.o male admitted for GI evaluation for possible food impaction. Maintained airway and tolerated secretions and water. EGD done 12/16 showing no food bolus or evidence of recent impaction seen in the entire esophagus. Gastritis present and normal examined duodenum. Was cleared by GI for discharge home on chopped-up diet but had frequent oxygen saturations in  70-80's. Improved to 90s with 35% trach collar. RT evaluated, difficulty with suctioning tracheostomy site. ENT was consulted and found dry crust occupying about 90% of the stoma, which was cleared with improvement of his respiratory status. Patient to discharge home with  services when medically stable. Pt also given ambulatory ENT and primary care referrals. SLP educated Pt on proper laryngectomy care. Pt educated on hospital course and plan, verbally agrees and understands. All questions answered.        Goals of Care Treatment Preferences:  Code Status: Full Code      Consults:   Consults (From admission, onward)          Status Ordering Provider     Inpatient consult to ENT  Once        Provider:  (Not yet assigned)    Completed NGHIA SILVERMAN     Inpatient consult to Registered Dietitian/Nutritionist  Once        Provider:  (Not yet assigned)    Completed FELA ARREDONDO     Inpatient consult to Gastroenterology  Once        Provider:  (Not yet assigned)    Completed JAMAR UPTON            * Tracheal stenosis  81 y.o. male with history of history of laryngeal cancer s/p total laryngectomy (2008) and tracheostomy placement (no adjuvant therapy) who presents with feeling of food impaction in esophagus. Had food impaction approx 2 years ago that did not require EGD, he was able to cough it up.      Suspect obstruction of stoma causing symptoms, not food obstruction, see below   - in ED able to tolerate PO liquid and secretions. Maintaining airway  - s/p glucagon at outside ED  - GI consulted, appreciate recs  - EDG 2/16 without impacted food seen in the entirety of the esophagus. Gastritis was present, duodenum normal.   - SLP consulted, awaiting official recs, however will resume soft mechanical diet  - Boost tid per RD recommendation  - ENT consulted. Patient still with FB sensation. Oxygen desaturations. Requiring trach collar with 35% O2 to maintain O2 saturation. Patient has history of removing  trach collar.  - Recommend SLP consultation for continuing laryngectomy education.  - ENT changed to soft laryngectomy tube.      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    Specialty: Family Medicine  Why: Follow-up 2/28/22 9:00AM  Contact information:  [address]  [facility] [address]  [phone]                           Patient Instructions:      Ambulatory referral/consult to ENT   Standing Status: Future   Referral Priority: Urgent Referral Type: Consultation   Referral Reason: Specialty Services Required   Requested Specialty: Otolaryngology   Number of Visits Requested: 1     Ambulatory referral/consult to Internal Medicine   Standing Status: Future   Referral Priority: Urgent Referral Type: Consultation   Referral Reason: Specialty Services Required   Requested Specialty: Internal Medicine   Number of Visits Requested: 1     Notify your health care provider if you experience any of the following:  temperature >100.4     Notify your health care provider if you experience any of the following:  severe uncontrolled pain     Notify your health care provider if you experience any of the following:  difficulty breathing or increased cough     Notify your health care provider if you experience any of the following:  persistent dizziness, light-headedness, or visual disturbances     Notify your health care provider if you experience any of the following:  increased  confusion or weakness     Activity as tolerated       Significant Diagnostic Studies: Labs: All labs within the past 24 hours have been reviewed    Pending Diagnostic Studies:       None           Medications:  Reconciled Home Medications:      Medication List      You have not been prescribed any medications.         Indwelling Lines/Drains at time of discharge:   Lines/Drains/Airways       None                   Time spent on the discharge of patient: 36 minutes         Mami Mayorga PA-C  Department of Hospital Medicine  [facility] - Telemetry Stepdown ([place]

## 2022-02-18 NOTE — PROGRESS NOTES
[facility] - Telemetry Stepdown ([place] [address])  Otorhinolaryngology-Head & Neck Surgery  Progress Note    Subjective:     Post-Op Info:  Procedure(s) (LRB):  EGD (ESOPHAGOGASTRODUODENOSCOPY) (N/A)   2 Days Post-Op  Hospital Day: 4     Interval History: NAEON. 6-0 cuffless placed yesteryda after serial dilation. Doing well, no new issues.     Medications:  Continuous Infusions:  Scheduled Meds:   enoxaparin  40 mg Subcutaneous Daily    melatonin  6 mg Oral Nightly     PRN Meds:acetaminophen, albuterol-ipratropium, aluminum-magnesium hydroxide-simethicone, dextrose 10%, dextrose 10%, glucagon (human recombinant), glucose, glucose, naloxone, ondansetron, polyethylene glycol, prochlorperazine, simethicone, sodium chloride 0.9%, sodium chloride 0.9%     Review of patient's allergies indicates:  No Known Allergies  Objective:     Vital Signs (24h Range):  Temp:  [97.4 °F (36.3 °C)-98.5 °F (36.9 °C)] 97.4 °F (36.3 °C)  Pulse:  [] 89  Resp:  [17-20] 17  SpO2:  [90 %-100 %] 98 %  BP: ()/(53-80) 142/69       Lines/Drains/Airways     Airway                 Surgical Airway 02/17/22 0930 Shiley Uncuffed <1 day          Peripheral Intravenous Line                 Peripheral IV - Single Lumen 02/15/22 1145 22 G Left Hand 2 days                Physical Exam   Awake alert  EOMI  Neck - 6-0 cuffless trach in. Stoma examined, clean, and laryngectomy plastic tube placed    Significant Labs:  BMP:   Recent Labs   Lab 02/17/22  2256   GLU 85      CO2 26   BUN 23   CREATININE 0.9   CALCIUM 9.6     CBC:   Recent Labs   Lab 02/17/22  2256   WBC 5.84   RBC 4.32*   HGB 14.1   HCT 44.3      *   MCH 32.6*   MCHC 31.8*       Significant Diagnostics:  I have reviewed and interpreted all pertinent imaging results/findings within the past 24 hours.    Assessment/Plan:     Squamous cell carcinoma of larynx  S/p laryngectomy in 2008, no adjuvant tx. Last follow up in 2013, no further surveillance. Presents with food  impaction, EGD wnl. Also with difficulty breathing and large crust within stenotic laryngectomy stoma. Stoma dilated and 6-0 cuffless trahc placed today.     - Recommend humidified O2  - Recommend saline bullets with RT q4h while awake  - Recommend frequent gentle suctioning with flexible suction catheters  - Can replace laryngectomy tube if it fall out, may need lubrication  - Pt instructed to keep laryngectomy tube in place at all times  - Recommend sending home w extra neck ties  - Recommend SLP consultation for continuing laryngectomy education  - Okay for discharge from ENT perspective, rest of care per primary team  - Pt is a neck breather, and can only be intubated through stoma in case of emergency        Rao Crowell MD  Otorhinolaryngology-Head & Neck Surgery  [facility] - Telemetry Stepdown ([place])

## 2022-02-19 PROCEDURE — G0180 MD CERTIFICATION HHA PATIENT: HCPCS | Mod: ,,, | Performed by: INTERNAL MEDICINE

## 2022-02-19 PROCEDURE — G0180 PR HOME HEALTH MD CERTIFICATION: ICD-10-PCS | Mod: ,,, | Performed by: INTERNAL MEDICINE

## 2022-02-21 ENCOUNTER — TELEPHONE (OUTPATIENT)
Dept: INTERNAL MEDICINE | Facility: CLINIC | Age: 82
End: 2022-02-21
Payer: MEDICARE

## 2022-02-21 NOTE — TELEPHONE ENCOUNTER
----- Message from Siomara Milton sent at 2/21/2022  2:32 PM CST -----  Contact: The medical team/Rafa/[phone] or   Rafa said that she is calling in regards to pt was discharged from the hospital and has a follow up visit with Dr Galindo on 2/28 pt does not have a pcp at this time she stated that  pt was diagnosed with trachea stenosis that had to be dlilated(Pt has a history of Laryngeal Cancer and total Laryngectomy 2008) the area was dilated and a soft laryngectomy tube was inserted , because their was no respiratory therapist on staff pt was sent home , once pt got home the tube came out the nurse did a visit on yesterday she could not reinsert the tube, a 60 Cuffless Is what pt has, she stated that they cannot do anything with the tube , pt was advised that the tube he has does not require suction, she stated that pt does not have any supplies , pt will need permanent Trach care services, she Is not sure what pt needs to do or what doctor pt needs to see but they need a provider that is in their surrounding area. Please

## 2022-02-22 ENCOUNTER — TELEPHONE (OUTPATIENT)
Dept: INTERNAL MEDICINE | Facility: CLINIC | Age: 82
End: 2022-02-22
Payer: MEDICARE

## 2022-02-22 NOTE — TELEPHONE ENCOUNTER
"Per the message yesterday, "they need a provider that is in their surrounding area".  I'm not accepting new patients as I'm leaving [facility].  This appointment needs to be rescheduled with a PCP who is accepting new patients.  Please reschedule.  "

## 2022-02-22 NOTE — TELEPHONE ENCOUNTER
Received call from Rafa de león/ [facility]. Pt recently admitted for possible esophagus impaction. Pt has no PCP, needed someone in the area to est care with. Staff at [facility] scheduled an appt here for him but it was scheduled as a nurse visit, not an office visit. Spoke w/ Rafa to discuss scheduling the appointment correctly.     The patient requires transportation services for his visits so she will call the patient's family to see about when they could arrange for this. Notified Rafa to return call when she does so we can find somewhere for him to be seen sooner than 3/9.

## 2022-02-24 ENCOUNTER — OFFICE VISIT (OUTPATIENT)
Dept: INTERNAL MEDICINE | Facility: CLINIC | Age: 82
End: 2022-02-24
Payer: MEDICARE

## 2022-02-24 VITALS
DIASTOLIC BLOOD PRESSURE: 82 MMHG | WEIGHT: 125.44 LBS | HEIGHT: 69 IN | HEART RATE: 84 BPM | RESPIRATION RATE: 18 BRPM | BODY MASS INDEX: 18.58 KG/M2 | SYSTOLIC BLOOD PRESSURE: 130 MMHG

## 2022-02-24 DIAGNOSIS — Z09 HOSPITAL DISCHARGE FOLLOW-UP: Primary | ICD-10-CM

## 2022-02-24 DIAGNOSIS — Z90.02 STATUS POST LARYNGECTOMY: ICD-10-CM

## 2022-02-24 DIAGNOSIS — J39.8 TRACHEAL STENOSIS: ICD-10-CM

## 2022-02-24 PROCEDURE — 1111F PR DISCHARGE MEDS RECONCILED W/ CURRENT OUTPATIENT MED LIST: ICD-10-PCS | Mod: CPTII,S$GLB,, | Performed by: INTERNAL MEDICINE

## 2022-02-24 PROCEDURE — 1159F PR MEDICATION LIST DOCUMENTED IN MEDICAL RECORD: ICD-10-PCS | Mod: CPTII,S$GLB,, | Performed by: INTERNAL MEDICINE

## 2022-02-24 PROCEDURE — 99203 OFFICE O/P NEW LOW 30 MIN: CPT | Mod: S$GLB,,, | Performed by: INTERNAL MEDICINE

## 2022-02-24 PROCEDURE — 99999 PR PBB SHADOW E&M-EST. PATIENT-LVL III: CPT | Mod: PBBFAC,,, | Performed by: INTERNAL MEDICINE

## 2022-02-24 PROCEDURE — 1111F DSCHRG MED/CURRENT MED MERGE: CPT | Mod: CPTII,S$GLB,, | Performed by: INTERNAL MEDICINE

## 2022-02-24 PROCEDURE — 1101F PT FALLS ASSESS-DOCD LE1/YR: CPT | Mod: CPTII,S$GLB,, | Performed by: INTERNAL MEDICINE

## 2022-02-24 PROCEDURE — 99203 PR OFFICE/OUTPT VISIT, NEW, LEVL III, 30-44 MIN: ICD-10-PCS | Mod: S$GLB,,, | Performed by: INTERNAL MEDICINE

## 2022-02-24 PROCEDURE — 1126F AMNT PAIN NOTED NONE PRSNT: CPT | Mod: CPTII,S$GLB,, | Performed by: INTERNAL MEDICINE

## 2022-02-24 PROCEDURE — 1126F PR PAIN SEVERITY QUANTIFIED, NO PAIN PRESENT: ICD-10-PCS | Mod: CPTII,S$GLB,, | Performed by: INTERNAL MEDICINE

## 2022-02-24 PROCEDURE — 99999 PR PBB SHADOW E&M-EST. PATIENT-LVL III: ICD-10-PCS | Mod: PBBFAC,,, | Performed by: INTERNAL MEDICINE

## 2022-02-24 PROCEDURE — 3079F DIAST BP 80-89 MM HG: CPT | Mod: CPTII,S$GLB,, | Performed by: INTERNAL MEDICINE

## 2022-02-24 PROCEDURE — 3288F FALL RISK ASSESSMENT DOCD: CPT | Mod: CPTII,S$GLB,, | Performed by: INTERNAL MEDICINE

## 2022-02-24 PROCEDURE — 1101F PR PT FALLS ASSESS DOC 0-1 FALLS W/OUT INJ PAST YR: ICD-10-PCS | Mod: CPTII,S$GLB,, | Performed by: INTERNAL MEDICINE

## 2022-02-24 PROCEDURE — 3075F SYST BP GE 130 - 139MM HG: CPT | Mod: CPTII,S$GLB,, | Performed by: INTERNAL MEDICINE

## 2022-02-24 PROCEDURE — 3288F PR FALLS RISK ASSESSMENT DOCUMENTED: ICD-10-PCS | Mod: CPTII,S$GLB,, | Performed by: INTERNAL MEDICINE

## 2022-02-24 PROCEDURE — 3075F PR MOST RECENT SYSTOLIC BLOOD PRESS GE 130-139MM HG: ICD-10-PCS | Mod: CPTII,S$GLB,, | Performed by: INTERNAL MEDICINE

## 2022-02-24 PROCEDURE — 1159F MED LIST DOCD IN RCRD: CPT | Mod: CPTII,S$GLB,, | Performed by: INTERNAL MEDICINE

## 2022-02-24 PROCEDURE — 3079F PR MOST RECENT DIASTOLIC BLOOD PRESSURE 80-89 MM HG: ICD-10-PCS | Mod: CPTII,S$GLB,, | Performed by: INTERNAL MEDICINE

## 2022-02-24 NOTE — PROGRESS NOTES
Referred to Dr. Constantino per Dr. Reynolds for status post laryngectomy & tracheal stenosis. Referral, clinic notes, test results, insurance and demographic information faxed to his office. Instructed patient to follow up with his office in a couple of days for scheduling.

## 2022-02-24 NOTE — PROGRESS NOTES
Subjective:       Patient ID: Silvio Mayroga is a 81 y.o. male.    Chief Complaint: Establish Care    Discharge Summary        Patient Name: Silvio Mayorga  MRN: [id number]  Patient Class: IP- Inpatient  Admission Date: 2/15/2022  Hospital Length of Stay: 1 days  Discharge Date and Time: 2/18/2022 10:58 PM  Attending Physician: Dia Castro MD   Discharging Provider: Mami Mayorga PA-C  Primary Care Provider: Primary Doctor [facility] Medicine Team: [facility] Mami Mayorga PA-C     HPI:   Silvio Mayorga is a 81 y.o. male with a PMHx of laryngeal cancer s/p total laryngectomy (2008) and tracheostomy placement (no adjuvant therapy) who presented to the ED with the feeling of a food impaction in esophagus. He swallowed a piece of pork last night when he felt it get stuck. This morning he was able to cough up a small piece of the food, but it still feels like something is in his throat. He went to the ED at [facility] and was transferred here for GI eval. Approximately 2 years ago he had a food impaction but was able to cough it up and has never required endoscopy for removal. He is able to tolerate liquids, was witnessed drinking water in ED. He is swallowing his saliva. No airway compromise and he denies SOB, abdominal pain, N/V, and chest pain.      He received glucagon at outside facility. Patient was able to swallow half a cup of water in ED and felt it go down.     ED: Afebrile, /102, , on trach collar. CBC, CMP without acute abnormalities. CXR without acute process.        Procedure(s) (LRB):  EGD (ESOPHAGOGASTRODUODENOSCOPY) (N/A)       Hospital Course:   81 y.o male admitted for GI evaluation for possible food impaction. Maintained airway and tolerated secretions and water. EGD done 12/16 showing no food bolus or evidence of recent impaction seen in the entire esophagus. Gastritis present and normal examined duodenum. Was cleared by GI for discharge home on chopped-up  diet but had frequent oxygen saturations in 70-80's. Improved to 90s with 35% trach collar. RT evaluated, difficulty with suctioning tracheostomy site. ENT was consulted and found dry crust occupying about 90% of the stoma, which was cleared with improvement of his respiratory status. Patient to discharge home with  services when medically stable. Pt also given ambulatory ENT and primary care referrals. SLP educated Pt on proper laryngectomy care. Pt educated on hospital course and plan, verbally agrees and understands. All questions answered.         Goals of Care Treatment Preferences:  Code Status: Full Code        Consults:   Consults (From admission, onward)           Status Ordering Provider        Inpatient consult to ENT  Once       Provider:  (Not yet assigned)   Completed NGHIA SILVERMAN        Inpatient consult to Registered Dietitian/Nutritionist  Once       Provider:  (Not yet assigned)   Completed FELA ARREDONDO        Inpatient consult to Gastroenterology  Once       Provider:  (Not yet assigned)   Completed JAMAR UPTON S              * Tracheal stenosis  81 y.o. male with history of history of laryngeal cancer s/p total laryngectomy (2008) and tracheostomy placement (no adjuvant therapy) who presents with feeling of food impaction in esophagus. Had food impaction approx 2 years ago that did not require EGD, he was able to cough it up.      Suspect obstruction of stoma causing symptoms, not food obstruction, see below   - in ED able to tolerate PO liquid and secretions. Maintaining airway  - s/p glucagon at outside ED  - GI consulted, appreciate recs  - EDG 2/16 without impacted food seen in the entirety of the esophagus. Gastritis was present, duodenum normal.   - SLP consulted, awaiting official recs, however will resume soft mechanical diet  - Boost tid per RD recommendation  - ENT consulted. Patient still with FB sensation. Oxygen desaturations. Requiring trach collar with 35% O2 to  maintain O2 saturation. Patient has history of removing trach collar.  - Recommend SLP consultation for continuing laryngectomy education.  - ENT changed to soft laryngectomy tube.        Final Active Diagnoses:    Diagnosis Date Noted POA   PRINCIPAL PROBLEM:  Tracheal stenosis (J39.8) 02/15/2022 Yes   Alcohol abuse (F10.10) 02/16/2022 Yes   Status post laryngectomy (Z90.02) 02/15/2022 Not Applicable   Squamous cell carcinoma of larynx (C32.9) 02/15/2022 Yes     Problems Resolved During this Admission:    Diagnosis Date Noted Date Resolved POA   Tracheostomy in place (Z93.0) 02/15/2022 02/16/2022 Not Applicable        Discharged Condition: good     Disposition: Home-Health Care [facility]     Follow Up:      Follow-up Information            Tato Galindo MD On 2/28/2022.   Specialty: Family Medicine  Why: Follow-up 2/28/22 9:00AM  Contact information:  [address]  [facility] [address]  [phone]            2/24/22  Silvio Mayorga is a 81 y.o. male  Here with hospital discharge follow up .  See discharge summary   Brought by his wife   Cant talk due to tracheostomy : 2008  .   He has home health [facility].        Review of Systems   Constitutional: Negative for chills and fever.   HENT: Negative for congestion, postnasal drip and sore throat.    Eyes: Negative for photophobia.   Respiratory: Negative for chest tightness and shortness of breath.    Cardiovascular: Negative for chest pain.   Gastrointestinal: Negative for abdominal distention, abdominal pain, blood in stool and vomiting.   Genitourinary: Negative for dysuria, flank pain and hematuria.   Musculoskeletal: Negative for back pain.   Skin: Negative for pallor.   Neurological: Negative for dizziness, seizures, facial asymmetry, speech difficulty and numbness.   Hematological: Does not bruise/bleed easily.   Psychiatric/Behavioral: Negative for agitation and suicidal ideas. The patient is not nervous/anxious.        Objective:       Physical Exam  Vitals and nursing note reviewed.   Constitutional:       Appearance: He is well-developed.   HENT:      Head: Normocephalic and atraumatic.   Neck:      Vascular: No JVD.      Comments: Soft laryngeal  tube .  Cardiovascular:      Rate and Rhythm: Normal rate and regular rhythm.      Heart sounds: Normal heart sounds.   Pulmonary:      Effort: Pulmonary effort is normal.      Breath sounds: Normal breath sounds.   Abdominal:      General: Bowel sounds are normal.      Palpations: Abdomen is soft.      Tenderness: There is no abdominal tenderness.   Skin:     General: Skin is warm and dry.   Neurological:      Mental Status: He is alert and oriented to person, place, and time.   Psychiatric:         Behavior: Behavior normal.         Thought Content: Thought content normal.         Judgment: Judgment normal.         Assessment:       1. Hospital discharge follow-up    2. Status post laryngectomy    3. Tracheal stenosis        Plan:   Silvio was seen today for establish care.    Diagnoses and all orders for this visit:    Hospital discharge follow-up    Status post laryngectomy  -     Ambulatory referral/consult to ENT; Future    Tracheal stenosis  -     Ambulatory referral/consult to ENT; Future      Problem List Items Addressed This Visit    None

## 2022-03-11 ENCOUNTER — EXTERNAL HOME HEALTH (OUTPATIENT)
Dept: HOME HEALTH SERVICES | Facility: HOSPITAL | Age: 82
End: 2022-03-11
Payer: MEDICARE

## 2022-05-12 ENCOUNTER — DOCUMENT SCAN (OUTPATIENT)
Dept: HOME HEALTH SERVICES | Facility: HOSPITAL | Age: 82
End: 2022-05-12
Payer: MEDICARE

## 2022-07-11 ENCOUNTER — EXTERNAL HOME HEALTH (OUTPATIENT)
Dept: HOME HEALTH SERVICES | Facility: HOSPITAL | Age: 82
End: 2022-07-11
Payer: MEDICARE

## 2023-02-04 ENCOUNTER — HOSPITAL ENCOUNTER (EMERGENCY)
Facility: HOSPITAL | Age: 83
Discharge: HOME | End: 2023-02-04
Attending: SURGERY
Payer: MEDICARE

## 2023-02-04 VITALS
HEART RATE: 109 BPM | DIASTOLIC BLOOD PRESSURE: 97 MMHG | OXYGEN SATURATION: 100 % | TEMPERATURE: 98 F | RESPIRATION RATE: 18 BRPM | SYSTOLIC BLOOD PRESSURE: 144 MMHG

## 2023-02-04 DIAGNOSIS — R33.9 URINE RETENTION: ICD-10-CM

## 2023-02-04 DIAGNOSIS — N30.00 ACUTE CYSTITIS WITHOUT HEMATURIA: Primary | ICD-10-CM

## 2023-02-04 LAB
ALBUMIN SERPL BCP-MCNC: 3.5 G/DL (ref 3.5–5.2)
ALP SERPL-CCNC: 116 U/L (ref 55–135)
ALT SERPL W/O P-5'-P-CCNC: 15 U/L (ref 10–44)
ANION GAP SERPL CALC-SCNC: 15 MMOL/L (ref 8–16)
AST SERPL-CCNC: 28 U/L (ref 10–40)
BACTERIA #/AREA URNS HPF: ABNORMAL /HPF
BASOPHILS # BLD AUTO: 0.05 K/UL (ref 0–0.2)
BASOPHILS NFR BLD: 0.7 % (ref 0–1.9)
BILIRUB SERPL-MCNC: 1.3 MG/DL (ref 0.1–1)
BILIRUB UR QL STRIP: NEGATIVE
BUN SERPL-MCNC: 12 MG/DL (ref 8–23)
CALCIUM SERPL-MCNC: 10 MG/DL (ref 8.7–10.5)
CHLORIDE SERPL-SCNC: 102 MMOL/L (ref 95–110)
CLARITY UR: ABNORMAL
CO2 SERPL-SCNC: 17 MMOL/L (ref 23–29)
COLOR UR: YELLOW
CREAT SERPL-MCNC: 0.9 MG/DL (ref 0.5–1.4)
DIFFERENTIAL METHOD: ABNORMAL
EOSINOPHIL # BLD AUTO: 0 K/UL (ref 0–0.5)
EOSINOPHIL NFR BLD: 0 % (ref 0–8)
ERYTHROCYTE [DISTWIDTH] IN BLOOD BY AUTOMATED COUNT: 14.8 % (ref 11.5–14.5)
EST. GFR  (NO RACE VARIABLE): >60 ML/MIN/1.73 M^2
GLUCOSE SERPL-MCNC: 107 MG/DL (ref 70–110)
GLUCOSE UR QL STRIP: NEGATIVE
HCT VFR BLD AUTO: 47.1 % (ref 40–54)
HGB BLD-MCNC: 15.3 G/DL (ref 14–18)
HGB UR QL STRIP: ABNORMAL
HYALINE CASTS #/AREA URNS LPF: 0 /LPF
IMM GRANULOCYTES # BLD AUTO: 0.02 K/UL (ref 0–0.04)
IMM GRANULOCYTES NFR BLD AUTO: 0.3 % (ref 0–0.5)
KETONES UR QL STRIP: NEGATIVE
LEUKOCYTE ESTERASE UR QL STRIP: ABNORMAL
LIPASE SERPL-CCNC: 19 U/L (ref 4–60)
LYMPHOCYTES # BLD AUTO: 1 K/UL (ref 1–4.8)
LYMPHOCYTES NFR BLD: 14.5 % (ref 18–48)
MCH RBC QN AUTO: 30.4 PG (ref 27–31)
MCHC RBC AUTO-ENTMCNC: 32.5 G/DL (ref 32–36)
MCV RBC AUTO: 94 FL (ref 82–98)
MICROSCOPIC COMMENT: ABNORMAL
MONOCYTES # BLD AUTO: 0.3 K/UL (ref 0.3–1)
MONOCYTES NFR BLD: 4 % (ref 4–15)
NEUTROPHILS # BLD AUTO: 5.4 K/UL (ref 1.8–7.7)
NEUTROPHILS NFR BLD: 80.5 % (ref 38–73)
NITRITE UR QL STRIP: NEGATIVE
NRBC BLD-RTO: 0 /100 WBC
PH UR STRIP: 7 [PH] (ref 5–8)
PLATELET # BLD AUTO: 175 K/UL (ref 150–450)
PMV BLD AUTO: 11.1 FL (ref 9.2–12.9)
POCT GLUCOSE: 89 MG/DL (ref 70–110)
POTASSIUM SERPL-SCNC: 5.3 MMOL/L (ref 3.5–5.1)
PROT SERPL-MCNC: 9.1 G/DL (ref 6–8.4)
PROT UR QL STRIP: ABNORMAL
RBC # BLD AUTO: 5.04 M/UL (ref 4.6–6.2)
RBC #/AREA URNS HPF: 56 /HPF (ref 0–4)
SODIUM SERPL-SCNC: 134 MMOL/L (ref 136–145)
SP GR UR STRIP: 1.02 (ref 1–1.03)
URN SPEC COLLECT METH UR: ABNORMAL
UROBILINOGEN UR STRIP-ACNC: NEGATIVE EU/DL
WBC # BLD AUTO: 6.68 K/UL (ref 3.9–12.7)
WBC #/AREA URNS HPF: >100 /HPF (ref 0–5)

## 2023-02-04 PROCEDURE — 63600175 PHARM REV CODE 636 W HCPCS: Performed by: SURGERY

## 2023-02-04 PROCEDURE — 87086 URINE CULTURE/COLONY COUNT: CPT | Performed by: SURGERY

## 2023-02-04 PROCEDURE — 99285 EMERGENCY DEPT VISIT HI MDM: CPT | Mod: 25

## 2023-02-04 PROCEDURE — 36415 COLL VENOUS BLD VENIPUNCTURE: CPT | Performed by: SURGERY

## 2023-02-04 PROCEDURE — 87088 URINE BACTERIA CULTURE: CPT | Performed by: SURGERY

## 2023-02-04 PROCEDURE — 87186 SC STD MICRODIL/AGAR DIL: CPT | Performed by: SURGERY

## 2023-02-04 PROCEDURE — 81000 URINALYSIS NONAUTO W/SCOPE: CPT | Performed by: SURGERY

## 2023-02-04 PROCEDURE — 85025 COMPLETE CBC W/AUTO DIFF WBC: CPT | Performed by: SURGERY

## 2023-02-04 PROCEDURE — 87077 CULTURE AEROBIC IDENTIFY: CPT | Performed by: SURGERY

## 2023-02-04 PROCEDURE — 80053 COMPREHEN METABOLIC PANEL: CPT | Performed by: SURGERY

## 2023-02-04 PROCEDURE — 83690 ASSAY OF LIPASE: CPT | Performed by: SURGERY

## 2023-02-04 RX ORDER — CIPROFLOXACIN 500 MG/1
500 TABLET ORAL 2 TIMES DAILY
Qty: 14 TABLET | Refills: 0 | Status: SHIPPED | OUTPATIENT
Start: 2023-02-04 | End: 2023-02-11

## 2023-02-04 RX ORDER — TAMSULOSIN HYDROCHLORIDE 0.4 MG/1
0.4 CAPSULE ORAL DAILY
Qty: 30 CAPSULE | Refills: 0 | OUTPATIENT
Start: 2023-02-04 | End: 2023-11-24

## 2023-02-04 RX ORDER — CEFTRIAXONE 2 G/50ML
2 INJECTION, SOLUTION INTRAVENOUS
Status: COMPLETED | OUTPATIENT
Start: 2023-02-04 | End: 2023-02-04

## 2023-02-04 RX ADMIN — CEFTRIAXONE 2 G: 2 INJECTION, SOLUTION INTRAVENOUS at 03:02

## 2023-02-04 NOTE — ED PROVIDER NOTES
Encounter Date: 2/4/2023       History     Chief Complaint   Patient presents with    Urinary Problem     Pt arrives per [facility] with c/o urinary problem. Pt reports peeing just a little.     82-year-old male presents with urinary retention in the emergency room today  Patient has a tracheostomy & has a hard time communicating, wife at bedside  Wife at bedside does majority of the communicating, urinary hesitancy issues  Patient states he has urgency with little urine stream no history of BPH noted  No fever, no flank pain, no hematuria or dysuria, no penile discharge noted    Review of patient's allergies indicates:  No Known Allergies  Past Medical History:   Diagnosis Date    Cancer     Squamous cell carcinoma of larynx      Past Surgical History:   Procedure Laterality Date    ESOPHAGOGASTRODUODENOSCOPY N/A 2/16/2022    Procedure: EGD (ESOPHAGOGASTRODUODENOSCOPY);  Surgeon: Heber Miller MD;  Location: [address];  Service: Endoscopy;  Laterality: N/A;    laryngectomy      TRACHEOSTOMY       No family history on file.  Social History     Tobacco Use    Smoking status: Former    Smokeless tobacco: Never   Substance Use Topics    Alcohol use: Yes    Drug use: Not Currently     Review of Systems   Constitutional:  Negative for activity change, appetite change, fatigue, fever and unexpected weight change.   HENT:  Negative for congestion, ear pain, mouth sores, nosebleeds, rhinorrhea, sinus pressure, sneezing and sore throat.    Eyes:  Negative for pain, discharge, redness and itching.   Respiratory:  Negative for apnea, cough, chest tightness and shortness of breath.    Cardiovascular:  Negative for chest pain, palpitations and leg swelling.   Gastrointestinal:  Negative for abdominal distention, abdominal pain, anal bleeding, constipation, diarrhea, nausea and vomiting.   Endocrine: Negative.    Genitourinary:  Positive for decreased urine volume. Negative for dysuria, enuresis, flank pain and  frequency.   Musculoskeletal:  Negative for arthralgias, back pain, neck pain and neck stiffness.   Skin:  Negative for color change and wound.   Allergic/Immunologic: Negative.    Neurological:  Negative for dizziness, tremors, syncope, facial asymmetry, speech difficulty, weakness, light-headedness, numbness and headaches.   Hematological:  Negative for adenopathy. Does not bruise/bleed easily.   Psychiatric/Behavioral:  Negative for agitation, behavioral problems, hallucinations, self-injury and suicidal ideas. The patient is not nervous/anxious.      Physical Exam     Initial Vitals   BP Pulse Resp Temp SpO2   02/04/23 1402 02/04/23 1409 02/04/23 1409 02/04/23 1409 02/04/23 1409   (!) 198/102 (!) 114 18 98.1 °F (36.7 °C) 100 %      MAP       --                Physical Exam    Nursing note and vitals reviewed.  Constitutional: Vital signs are normal. He appears well-developed and well-nourished. He is cooperative.   HENT:   Head: Normocephalic and atraumatic.   Right Ear: Hearing, tympanic membrane, external ear and ear canal normal.   Left Ear: Hearing, tympanic membrane, external ear and ear canal normal.   Nose: Nose normal.   Mouth/Throat: Uvula is midline, oropharynx is clear and moist and mucous membranes are normal.   Eyes: Conjunctivae, EOM and lids are normal. Pupils are equal, round, and reactive to light.   Neck: No JVD present.   Tracheostomy clean dry & intact    Full passive range of motion without pain.     Cardiovascular:  Normal rate, regular rhythm, S1 normal, S2 normal, normal heart sounds, intact distal pulses and normal pulses.           Pulmonary/Chest: Effort normal and breath sounds normal.   Abdominal: Abdomen is soft and flat. Bowel sounds are normal.   Musculoskeletal:         General: Normal range of motion.      Cervical back: Full passive range of motion without pain.     Neurological: He is alert and oriented to person, place, and time. He has normal strength.   Skin: Skin is warm,  dry and intact. Capillary refill takes less than 2 seconds.       ED Course   Procedures  Labs Reviewed   COMPREHENSIVE METABOLIC PANEL - Abnormal; Notable for the following components:       Result Value    Sodium 134 (*)     Potassium 5.3 (*)     CO2 17 (*)     Total Protein 9.1 (*)     Total Bilirubin 1.3 (*)     All other components within normal limits   CBC W/ AUTO DIFFERENTIAL - Abnormal; Notable for the following components:    RDW 14.8 (*)     Gran % 80.5 (*)     Lymph % 14.5 (*)     All other components within normal limits   URINALYSIS, REFLEX TO URINE CULTURE - Abnormal; Notable for the following components:    Appearance, UA Cloudy (*)     Protein, UA 3+ (*)     Occult Blood UA 2+ (*)     Leukocytes, UA 2+ (*)     All other components within normal limits    Narrative:     Specimen Source->Urine   URINALYSIS MICROSCOPIC - Abnormal; Notable for the following components:    RBC, UA 56 (*)     WBC, UA >100 (*)     Bacteria Few (*)     All other components within normal limits    Narrative:     Specimen Source->Urine   CULTURE, URINE   LIPASE   POCT GLUCOSE          Imaging Results              CT Renal Stone Study ABD Pelvis WO (Final result)  Result time 02/04/23 16:08:19      Final result by Jared Santos MD (02/04/23 16:08:19)                   Impression:      1. Bowling catheter within the urinary bladder, which is partially distended with fluid and air, presumably secondary to catheterization versus air forming infection.  Circumferential urinary bladder wall thickening, which may be secondary to incomplete distension, chronic outflow obstruction and/or cystitis.  2. Mild diffuse gastric wall thickening, nonspecific and may be related to gastritis.  3. Mildly prominent air and fluid-filled loops of small bowel within the anterior upper and mid abdomen, nonspecific and could reflect nonspecific enteritis, focal ileus, or low-grade/partial small bowel obstruction.  4. Cholelithiasis without CT evidence of  acute cholecystitis.  5. Diffuse body wall anasarca and mild scattered ascites.  6. Marked calcific atherosclerosis.  7. Additional findings, as above.      Electronically signed by: Jared Santos  Date:    02/04/2023  Time:    16:08               Narrative:    EXAMINATION:  CT RENAL STONE STUDY ABD PELVIS WO    CLINICAL HISTORY:  Flank pain, kidney stone suspected;Pain lumbago (724.2);    TECHNIQUE:  Low dose axial images, sagittal and coronal reformations were obtained from the lung bases to the pubic symphysis.  Contrast was not administered.    COMPARISON:  PET-CT 10/23/2008    FINDINGS:  Lack of intravenous contrast limits detection for parenchymal organ disease.    Heart: Heart is mildly enlarged.  Small pericardial effusion.  Marked calcification of the coronary arteries.  Calcification of the mitral valve.    Lung Bases: Scattered bandlike densities at both lung bases suggestive of subsegmental atelectasis or scarring.  No pleural effusion.    Liver: Normal in size and attenuation, with no focal hepatic lesions.    Gallbladder: Scattered calcified gallstones.  No gallbladder wall thickening.    Bile Ducts: No evidence of dilated ducts.    Pancreas: No mass or peripancreatic fat stranding.    Spleen: Unremarkable.    Adrenals: Unremarkable.    Kidneys/ Ureters: Bilateral renal vascular calcifications.  No definite nephrolithiasis.  No hydronephrosis.  Stable-appearing 2.2 cm hypodense renal cortical lesion in the upper pole of the right kidney most compatible with a cyst.    Bladder: Bowling catheter is present within the urinary bladder, which is partially distended with fluid and non dependent air.  Moderate circumferential urinary bladder wall thickening.    Reproductive organs: Prostate gland measures approximately 3.0 x 4.3 cm and contains dystrophic calcifications.    GI Tract/Mesentery: Mild diffuse gastric wall thickening, nonspecific and could be reflect related to gastritis.  Scattered colonic  diverticula.  Mildly prominent air and fluid-filled loops of small bowel within the anterior upper and mid abdomen, nonspecific and could reflect nonspecific enteritis, focal ileus, or low-grade/partial small bowel obstruction.  Scattered intraperitoneal and extraperitoneal free fluid.  No pneumoperitoneum or evidence of abscess formation.    Peritoneal Space: No ascites. No free air.    Retroperitoneum: No significant adenopathy.    Abdominal wall: Diffuse body wall anasarca, slightly more pronounced along the left abdominal wall, possibly secondary to positioning.    Vasculature: Marked calcification of the aorta and branch vessels.  No aortic aneurysm.    Bones: No acute fracture.  Levoscoliosis and moderate to severe multilevel degenerative change of the lumbar spine.                                       Medications   cefTRIAXone 2 gram/50 mL IVPB 2 g (2 g Intravenous New Bag 23 [id number])     Medical Decision Makin-year-old male presents with urinary hesitancy urgency for last couple days now  Patient states that he is having hard time urinating, Bowling placed in the ER today  Urinalysis indicates urinary tract infection, no fever, no complicating symptoms  CT scan shows no obvious acute findings, anasarca & edema noted on evaluation  Patient will be on antibiotics for urinary tract infection with PCP follow-up on discharge  I have strongly recommend the patient follow-up with PCP on Monday morning on DC  Continue Flomax & antibiotics, PCP to reassess Bowling need going forward on discharge  I have also made this patient an ambulatory referral to Urology for further evaluation  Wife & patient carefully counseled return to the ER with any concerning symptoms                         Clinical Impression:   Final diagnoses:  [N30.00] Acute cystitis without hematuria (Primary)  [R33.9] Urine retention        ED Disposition Condition    Discharge Stable          ED Prescriptions       Medication Sig Dispense  Start Date End Date Auth. Provider    ciprofloxacin HCl (CIPRO) 500 MG tablet Take 1 tablet (500 mg total) by mouth 2 (two) times daily. for 7 days 14 tablet 2/4/2023 2/11/2023 Jared Perkins MD    tamsulosin (FLOMAX) 0.4 mg Cap Take 1 capsule (0.4 mg total) by mouth once daily. 30 capsule 2/4/2023 3/6/2023 Jared Perkins MD          Follow-up Information       Follow up With Specialties Details Why Contact Info    Dave Reynolds MD Internal Medicine Go in 2 days  [address]  [facility] [address]  [phone]               Jared Perkins MD  02/04/23 [id number]

## 2023-02-07 LAB — BACTERIA UR CULT: ABNORMAL

## 2023-03-31 ENCOUNTER — HOSPITAL ENCOUNTER (EMERGENCY)
Facility: HOSPITAL | Age: 83
Discharge: HOME | End: 2023-03-31
Attending: EMERGENCY MEDICINE
Payer: MEDICARE

## 2023-03-31 VITALS
RESPIRATION RATE: 20 BRPM | OXYGEN SATURATION: 100 % | WEIGHT: 126.56 LBS | SYSTOLIC BLOOD PRESSURE: 158 MMHG | DIASTOLIC BLOOD PRESSURE: 86 MMHG | BODY MASS INDEX: 18.96 KG/M2 | TEMPERATURE: 97 F | HEART RATE: 72 BPM

## 2023-03-31 DIAGNOSIS — R31.9 URINARY TRACT INFECTION WITH HEMATURIA, SITE UNSPECIFIED: ICD-10-CM

## 2023-03-31 DIAGNOSIS — N39.0 URINARY TRACT INFECTION WITH HEMATURIA, SITE UNSPECIFIED: ICD-10-CM

## 2023-03-31 DIAGNOSIS — R33.9 URINARY RETENTION: Primary | ICD-10-CM

## 2023-03-31 DIAGNOSIS — I10 UNCONTROLLED HYPERTENSION: ICD-10-CM

## 2023-03-31 LAB
BACTERIA #/AREA URNS HPF: ABNORMAL /HPF
BILIRUB UR QL STRIP: NEGATIVE
CLARITY UR: ABNORMAL
COLOR UR: YELLOW
GLUCOSE UR QL STRIP: NEGATIVE
HGB UR QL STRIP: ABNORMAL
HYALINE CASTS #/AREA URNS LPF: 0 /LPF
KETONES UR QL STRIP: NEGATIVE
LEUKOCYTE ESTERASE UR QL STRIP: ABNORMAL
MICROSCOPIC COMMENT: ABNORMAL
NITRITE UR QL STRIP: NEGATIVE
PH UR STRIP: 7 [PH] (ref 5–8)
PROT UR QL STRIP: ABNORMAL
RBC #/AREA URNS HPF: 30 /HPF (ref 0–4)
SP GR UR STRIP: 1.02 (ref 1–1.03)
SQUAMOUS #/AREA URNS HPF: 5 /HPF
URN SPEC COLLECT METH UR: ABNORMAL
UROBILINOGEN UR STRIP-ACNC: NEGATIVE EU/DL
WBC #/AREA URNS HPF: >100 /HPF (ref 0–5)

## 2023-03-31 PROCEDURE — 81000 URINALYSIS NONAUTO W/SCOPE: CPT | Performed by: EMERGENCY MEDICINE

## 2023-03-31 PROCEDURE — 25000003 PHARM REV CODE 250: Performed by: EMERGENCY MEDICINE

## 2023-03-31 PROCEDURE — 63600175 PHARM REV CODE 636 W HCPCS: Performed by: EMERGENCY MEDICINE

## 2023-03-31 PROCEDURE — 99284 EMERGENCY DEPT VISIT MOD MDM: CPT

## 2023-03-31 PROCEDURE — 96372 THER/PROPH/DIAG INJ SC/IM: CPT | Performed by: EMERGENCY MEDICINE

## 2023-03-31 RX ORDER — SULFAMETHOXAZOLE AND TRIMETHOPRIM 800; 160 MG/1; MG/1
1 TABLET ORAL 2 TIMES DAILY
Qty: 14 TABLET | Refills: 0 | Status: SHIPPED | OUTPATIENT
Start: 2023-03-31 | End: 2023-04-07

## 2023-03-31 RX ORDER — CLONIDINE HYDROCHLORIDE 0.1 MG/1
0.2 TABLET ORAL
Status: COMPLETED | OUTPATIENT
Start: 2023-03-31 | End: 2023-03-31

## 2023-03-31 RX ORDER — CEFTRIAXONE 1 G/1
1 INJECTION, POWDER, FOR SOLUTION INTRAMUSCULAR; INTRAVENOUS
Status: COMPLETED | OUTPATIENT
Start: 2023-03-31 | End: 2023-03-31

## 2023-03-31 RX ADMIN — CLONIDINE HYDROCHLORIDE 0.2 MG: 0.1 TABLET ORAL at 11:03

## 2023-03-31 RX ADMIN — CEFTRIAXONE SODIUM 1 G: 1 INJECTION, POWDER, FOR SOLUTION INTRAMUSCULAR; INTRAVENOUS at 02:03

## 2023-03-31 NOTE — DISCHARGE INSTRUCTIONS
1) Make appointment with your primary doctor for high blood pressure and to check urine culture results  2) Take all antibiotics for UTI  3) Make urology appointment (they will call you this week) to evaluate need for catheter, blood in your urine, UTI   Post-Anesthesia Evaluation and Assessment    Patient: Evan Lemons MRN: [id number]  SSN: [id number]    YOB: 1938  Age: 80 y.o. Sex: female      I have evaluated the patient and they are stable and ready for discharge from the PACU. Cardiovascular Function/Vital Signs  Visit Vitals  /62   Pulse 64   Temp 36.2 °C (97.2 °F)   Resp 17   Ht 5' 1.75\" (1.568 m)   Wt 86.2 kg (190 lb 2 oz)   SpO2 97%   BMI 35.06 kg/m²       Patient is status post General anesthesia for Procedure(s):  LAPAROSCOPIC PARAESOPHAGEAL HERNIA REPAIR WITH MESH. Nausea/Vomiting: None    Postoperative hydration reviewed and adequate. Pain:  Pain Scale 1: FLACC(\"a lot\") (07/22/19 1210)  Pain Intensity 1: 0 (07/22/19 1118)   Managed    Neurological Status:   Neuro (WDL): Exceptions to WDL (07/22/19 1153)  Neuro  Neurologic State: Drowsy (07/22/19 1153)  Orientation Level: Oriented to person (07/22/19 1153)  Cognition: Follows commands (07/22/19 1153)  Speech: Clear (07/22/19 1153)  LUE Motor Response: Purposeful (07/22/19 1153)  LLE Motor Response: Purposeful (07/22/19 1153)  RUE Motor Response: Purposeful (07/22/19 1153)  RLE Motor Response: Purposeful (07/22/19 1153)   At baseline    Mental Status, Level of Consciousness: Alert and  oriented to person, place, and time    Pulmonary Status:   O2 Device: Room air (07/22/19 1210)   Adequate oxygenation and airway patent    Complications related to anesthesia: None    Post-anesthesia assessment completed. No concerns    Signed By: Cate Plasencia MD     July 22, 2019              Procedure(s):  LAPAROSCOPIC PARAESOPHAGEAL HERNIA REPAIR WITH MESH. general    <BSHSIANPOST>    Vitals Value Taken Time   /62 7/22/2019 12:30 PM   Temp 36.2 °C (97.2 °F) 7/22/2019 11:53 AM   Pulse 67 7/22/2019 12:40 PM   Resp 15 7/22/2019 12:40 PM   SpO2 88 % 7/22/2019 12:40 PM   Vitals shown include unvalidated device data.

## 2023-03-31 NOTE — ED PROVIDER NOTES
[facility]                                                  Chief Complaint  82 y.o. male with Male  Problem    History of Present Illness  Silvio Mayorga presents to the emergency room with request for catheter discontinuation.  Per patient he was seen here in February and given a indwelling catheter for urinary leakage secondary to an infection.  Patient is very difficult to understand secondary to history of laryngectomy however it appears that he has had this catheter since February.  He denies pain.    Past Medical History:   Diagnosis Date    Cancer     Squamous cell carcinoma of larynx      Past Surgical History:   Procedure Laterality Date    ESOPHAGOGASTRODUODENOSCOPY N/A 2/16/2022    Procedure: EGD (ESOPHAGOGASTRODUODENOSCOPY);  Surgeon: Heber Miller MD;  Location: [address]);  Service: Endoscopy;  Laterality: N/A;    laryngectomy      TRACHEOSTOMY        Review of patient's allergies indicates:  No Known Allergies     Review of Systems and Physical Exam     Review of Systems  -- Constitution - no fever, no weight loss, no loss of consciousness  -- Eyes - no changes in vision, no redness, no swelling  -- Ear, Nose - no  earache, denies congestion  -- Mouth,Throat - no sore throat, no toothache, normal voice, normal swallowing  -- Respiratory - denies cough and congestion, no shortness of breath, no wheezing  -- Cardiovascular - denies chest pain, no palpitations,   -- Gastrointestinal - denies abdominal pain, denies nausea, vomiting, and diarrhea  -- Genitourinary - no dysuria, no denies flank pain, no hematuria or frequency request catheter removal  -- Musculoskeletal - denies back pain, negative for myalgias and arthralgias   -- Neurological - no headache, no neurologic changes, no loss of bladder or bowel function no seizure like activity, no changes in hearing or vision  -- Skin - denies skin changes, no rash, no hives, no suspected skin infection    Vital  Signs   weight is 57.4 kg (126 lb 8.7 oz). His temperature is 97 °F (36.1 °C). His blood pressure is 217/114 (abnormal) and his pulse is 85. His respiration is 20.      Physical Exam  -- Nursing note and vitals reviewed  -- Constitutional:  Awake alert and oriented, GCS 15, no acute distress.  Appears well.  -- Head: Atraumatic. Normocephalic. No obvious abnormality  -- Eyes: Pupils are equal and reactive to light. Extraocular movements intact. No nystagmus.  No periorbital swelling. Normal conjunctiva.  -- Nose: Nose grossly normal in appearance, nares grossly normal. No rhinorrhea.  -- Throat: Mucous membranes moist, pharynx normal, normal tonsils.  Airway patent.  -- Ears: External ears and TM normal bilaterally. Normal hearing.   -- Neck: Normal range of motion. Neck supple. No meningismus. No adenopathy  -- Cardiac: Normal rate, regular rhythm and normal heart sounds. No carotid bruit. No lower extremity edema.  -- Pulmonary: Normal respiratory effort, breath sounds equal bilaterally. Adequate flow.  No wheezing.  No crackles.  -- Abdominal: Soft, no tenderness, no guarding, no rebound. Normal bowel sounds.  Old appearing catheter in place with leg bag.  Catheter is duct taped and malodorous  -- Musculoskeletal: Normal range of motion, all 4 extremities 5/5 strength.  Neurovascularly intact. Atraumatic. No deformities.  -- Neurological:  Cranial nerves 2-12 grossly intact. No focal deficits.   -- Vascular: Posterior tibial, dorsalis pedis and radial pulses 2+ bilaterally    -- Lymphatics: No cervical or peripheral lymphadenopathy.   -- Skin: Warm and dry. No evidence of rash or cellulitis  -- Psychiatric: Normal mood and affect. Bedside behavior is appropriate.  Patient is cooperative.  Denies suicidal homicidal ideation.    Emergency Room Course     Treatment Course, Evaluation, and Medical Decision Making  1. Physical exam significant for old-appearing catheter it is also malodorous.  2. Bowling cath removed by  nursing  3. Urinalysis + bacteria and RBC  4. Clonidine 0.2 mg p.o. for hypertension in the ED  5. Rocephin 1 g IM  6. Follow up Urology, DC home with abx      Abnormal lab values  Labs Reviewed   URINALYSIS       Medications Given  Medications   cloNIDine tablet 0.2 mg (has no administration in time range)         Diagnosis  --UTI  --hematuria  --urinary retention  --hypertension, uncontrolled    Disposition and Plan  -- Disposition: home  -- Condition: stable  -- Follow-up: Patient to follow up with Primary Doctor No in 1-2 days, and any specialists noted on discharge paperwork  -- I advised the patient that we have found no life threatening condition today  -- At this time, I believe the patient is clinically stable for discharge.   -- The patient acknowledges that close follow up with a MD is required   -- Patient agrees to comply with all instruction and direction given in the ER  -- Patient counseled on strict return precautions as discussed       Ada William MD  03/31/23 4503

## 2023-11-24 ENCOUNTER — HOSPITAL ENCOUNTER (EMERGENCY)
Facility: HOSPITAL | Age: 83
Discharge: HOME | End: 2023-11-24
Attending: SURGERY
Payer: MEDICARE

## 2023-11-24 VITALS
SYSTOLIC BLOOD PRESSURE: 127 MMHG | HEIGHT: 68 IN | RESPIRATION RATE: 20 BRPM | HEART RATE: 104 BPM | OXYGEN SATURATION: 98 % | WEIGHT: 130 LBS | TEMPERATURE: 98 F | DIASTOLIC BLOOD PRESSURE: 78 MMHG | BODY MASS INDEX: 19.7 KG/M2

## 2023-11-24 DIAGNOSIS — N30.01 ACUTE CYSTITIS WITH HEMATURIA: ICD-10-CM

## 2023-11-24 DIAGNOSIS — N40.1 URINARY RETENTION DUE TO BENIGN PROSTATIC HYPERPLASIA: Primary | ICD-10-CM

## 2023-11-24 DIAGNOSIS — B37.42 CANDIDAL BALANITIS: ICD-10-CM

## 2023-11-24 DIAGNOSIS — R33.8 URINARY RETENTION DUE TO BENIGN PROSTATIC HYPERPLASIA: Primary | ICD-10-CM

## 2023-11-24 LAB
ABO + RH BLD: NORMAL
ALBUMIN SERPL BCP-MCNC: 3 G/DL (ref 3.5–5.2)
ALP SERPL-CCNC: 96 U/L (ref 55–135)
ALT SERPL W/O P-5'-P-CCNC: 12 U/L (ref 10–44)
AMORPH CRY URNS QL MICRO: ABNORMAL
ANION GAP SERPL CALC-SCNC: 13 MMOL/L (ref 8–16)
AST SERPL-CCNC: 14 U/L (ref 10–40)
BACTERIA #/AREA URNS HPF: ABNORMAL /HPF
BASOPHILS # BLD AUTO: 0.01 K/UL (ref 0–0.2)
BASOPHILS NFR BLD: 0.1 % (ref 0–1.9)
BILIRUB SERPL-MCNC: 1.3 MG/DL (ref 0.1–1)
BILIRUB UR QL STRIP: NEGATIVE
BLD GP AB SCN CELLS X3 SERPL QL: NORMAL
BUN SERPL-MCNC: 21 MG/DL (ref 8–23)
CALCIUM SERPL-MCNC: 9.2 MG/DL (ref 8.7–10.5)
CHLORIDE SERPL-SCNC: 108 MMOL/L (ref 95–110)
CLARITY UR: ABNORMAL
CO2 SERPL-SCNC: 22 MMOL/L (ref 23–29)
COLOR UR: YELLOW
CREAT SERPL-MCNC: 1.3 MG/DL (ref 0.5–1.4)
DIFFERENTIAL METHOD: ABNORMAL
EOSINOPHIL # BLD AUTO: 0 K/UL (ref 0–0.5)
EOSINOPHIL NFR BLD: 0 % (ref 0–8)
ERYTHROCYTE [DISTWIDTH] IN BLOOD BY AUTOMATED COUNT: 14.9 % (ref 11.5–14.5)
EST. GFR  (NO RACE VARIABLE): 55 ML/MIN/1.73 M^2
GLUCOSE SERPL-MCNC: 108 MG/DL (ref 70–110)
GLUCOSE UR QL STRIP: NEGATIVE
HCT VFR BLD AUTO: 41.6 % (ref 40–54)
HGB BLD-MCNC: 13.4 G/DL (ref 14–18)
HGB UR QL STRIP: ABNORMAL
HYALINE CASTS #/AREA URNS LPF: 0 /LPF
IMM GRANULOCYTES # BLD AUTO: 0.04 K/UL (ref 0–0.04)
IMM GRANULOCYTES NFR BLD AUTO: 0.5 % (ref 0–0.5)
KETONES UR QL STRIP: NEGATIVE
LACTATE SERPL-SCNC: 1.4 MMOL/L (ref 0.5–2.2)
LEUKOCYTE ESTERASE UR QL STRIP: ABNORMAL
LYMPHOCYTES # BLD AUTO: 1 K/UL (ref 1–4.8)
LYMPHOCYTES NFR BLD: 10.9 % (ref 18–48)
MCH RBC QN AUTO: 30.3 PG (ref 27–31)
MCHC RBC AUTO-ENTMCNC: 32.2 G/DL (ref 32–36)
MCV RBC AUTO: 94 FL (ref 82–98)
MICROSCOPIC COMMENT: ABNORMAL
MONOCYTES # BLD AUTO: 0.5 K/UL (ref 0.3–1)
MONOCYTES NFR BLD: 5.1 % (ref 4–15)
NEUTROPHILS # BLD AUTO: 7.4 K/UL (ref 1.8–7.7)
NEUTROPHILS NFR BLD: 83.4 % (ref 38–73)
NITRITE UR QL STRIP: NEGATIVE
NRBC BLD-RTO: 0 /100 WBC
OB PNL STL: NEGATIVE
PH UR STRIP: 6 [PH] (ref 5–8)
PLATELET # BLD AUTO: 248 K/UL (ref 150–450)
PMV BLD AUTO: 10.5 FL (ref 9.2–12.9)
POTASSIUM SERPL-SCNC: 3.6 MMOL/L (ref 3.5–5.1)
PROT SERPL-MCNC: 8.2 G/DL (ref 6–8.4)
PROT UR QL STRIP: ABNORMAL
RBC # BLD AUTO: 4.42 M/UL (ref 4.6–6.2)
RBC #/AREA URNS HPF: 3 /HPF (ref 0–4)
SODIUM SERPL-SCNC: 143 MMOL/L (ref 136–145)
SP GR UR STRIP: >=1.03 (ref 1–1.03)
SPECIMEN OUTDATE: NORMAL
SQUAMOUS #/AREA URNS HPF: 1 /HPF
URN SPEC COLLECT METH UR: ABNORMAL
UROBILINOGEN UR STRIP-ACNC: NEGATIVE EU/DL
WBC # BLD AUTO: 8.82 K/UL (ref 3.9–12.7)
WBC #/AREA URNS HPF: >100 /HPF (ref 0–5)
WBC CLUMPS URNS QL MICRO: ABNORMAL

## 2023-11-24 PROCEDURE — 36415 COLL VENOUS BLD VENIPUNCTURE: CPT | Performed by: SURGERY

## 2023-11-24 PROCEDURE — 80053 COMPREHEN METABOLIC PANEL: CPT | Performed by: SURGERY

## 2023-11-24 PROCEDURE — 83605 ASSAY OF LACTIC ACID: CPT | Performed by: SURGERY

## 2023-11-24 PROCEDURE — 99285 EMERGENCY DEPT VISIT HI MDM: CPT | Mod: 25

## 2023-11-24 PROCEDURE — 93010 ELECTROCARDIOGRAM REPORT: CPT | Mod: ,,, | Performed by: INTERNAL MEDICINE

## 2023-11-24 PROCEDURE — 25000003 PHARM REV CODE 250: Performed by: SURGERY

## 2023-11-24 PROCEDURE — 86901 BLOOD TYPING SEROLOGIC RH(D): CPT | Performed by: SURGERY

## 2023-11-24 PROCEDURE — 96360 HYDRATION IV INFUSION INIT: CPT

## 2023-11-24 PROCEDURE — 81000 URINALYSIS NONAUTO W/SCOPE: CPT | Performed by: SURGERY

## 2023-11-24 PROCEDURE — 87491 CHLMYD TRACH DNA AMP PROBE: CPT | Mod: 59 | Performed by: SURGERY

## 2023-11-24 PROCEDURE — 87040 BLOOD CULTURE FOR BACTERIA: CPT | Mod: 59 | Performed by: SURGERY

## 2023-11-24 PROCEDURE — 93010 EKG 12-LEAD: ICD-10-PCS | Mod: ,,, | Performed by: INTERNAL MEDICINE

## 2023-11-24 PROCEDURE — 99900035 HC TECH TIME PER 15 MIN (STAT)

## 2023-11-24 PROCEDURE — 87077 CULTURE AEROBIC IDENTIFY: CPT | Performed by: SURGERY

## 2023-11-24 PROCEDURE — 87154 CUL TYP ID BLD PTHGN 6+ TRGT: CPT | Performed by: SURGERY

## 2023-11-24 PROCEDURE — 87086 URINE CULTURE/COLONY COUNT: CPT | Performed by: SURGERY

## 2023-11-24 PROCEDURE — 93005 ELECTROCARDIOGRAM TRACING: CPT

## 2023-11-24 PROCEDURE — 82272 OCCULT BLD FECES 1-3 TESTS: CPT | Performed by: SURGERY

## 2023-11-24 PROCEDURE — 85025 COMPLETE CBC W/AUTO DIFF WBC: CPT | Performed by: SURGERY

## 2023-11-24 RX ORDER — CIPROFLOXACIN 750 MG/1
750 TABLET, FILM COATED ORAL EVERY 12 HOURS
Qty: 14 TABLET | Refills: 1 | Status: SHIPPED | OUTPATIENT
Start: 2023-11-24

## 2023-11-24 RX ORDER — TAMSULOSIN HYDROCHLORIDE 0.4 MG/1
0.4 CAPSULE ORAL DAILY
Qty: 10 CAPSULE | Refills: 0 | Status: SHIPPED | OUTPATIENT
Start: 2023-11-24 | End: 2024-11-23

## 2023-11-24 RX ORDER — HYDROCODONE BITARTRATE AND ACETAMINOPHEN 7.5; 325 MG/15ML; MG/15ML
15 SOLUTION ORAL
Status: COMPLETED | OUTPATIENT
Start: 2023-11-24 | End: 2023-11-24

## 2023-11-24 RX ORDER — MUPIROCIN 20 MG/G
OINTMENT TOPICAL 3 TIMES DAILY
Qty: 30 G | Refills: 1 | Status: SHIPPED | OUTPATIENT
Start: 2023-11-24

## 2023-11-24 RX ADMIN — SODIUM CHLORIDE 500 ML: 9 INJECTION, SOLUTION INTRAVENOUS at 02:11

## 2023-11-24 RX ADMIN — HYDROCODONE BITARTRATE AND ACETAMINOPHEN 15 ML: 7.5; 325 SOLUTION ORAL at 01:11

## 2023-11-24 NOTE — ED PROVIDER NOTES
Encounter Date: 11/24/2023       History     Chief Complaint   Patient presents with    Urinary Retention     Arrives via [facility]. Pt states he is having some difficulty urinating for the past week, been dribbling for the past 3 days.   Did complain of some chest pain and given a spray of nitro because he is ubale to chew aspirin. Not complaining of chest pain at this time.      Mr. Mayorga presents by ambulance with chief complaint of urinary retention he states he has been dribbling for the last several days he has had a tracheostomy for laryngectomy secondary to cancer    The history is provided by the patient.   Male  Problem  Primary symptoms include dysuria. This is a new problem. The current episode started several days ago. The problem occurs 2 - 4 times per day. The problem has been gradually worsening. The symptoms occur during urination. Associated symptoms include frequency. Pertinent negatives include no anorexia, no diaphoresis, no nausea and no constipation.     Review of patient's allergies indicates:  No Known Allergies  Past Medical History:   Diagnosis Date    Cancer     Squamous cell carcinoma of larynx      Past Surgical History:   Procedure Laterality Date    ESOPHAGOGASTRODUODENOSCOPY N/A 2/16/2022    Procedure: EGD (ESOPHAGOGASTRODUODENOSCOPY);  Surgeon: Heber Miller MD;  Location: [address]);  Service: Endoscopy;  Laterality: N/A;    laryngectomy      TRACHEOSTOMY       No family history on file.  Social History     Tobacco Use    Smoking status: Former    Smokeless tobacco: Never   Substance Use Topics    Alcohol use: Yes    Drug use: Not Currently     Review of Systems   Constitutional: Negative.  Negative for diaphoresis.   HENT: Negative.     Eyes: Negative.    Cardiovascular: Negative.    Gastrointestinal: Negative.  Negative for anorexia, constipation and nausea.   Endocrine: Negative.    Genitourinary:  Positive for dysuria and frequency.   Musculoskeletal: Negative.     Allergic/Immunologic: Negative.    Neurological: Negative.    Hematological: Negative.    Psychiatric/Behavioral: Negative.         Physical Exam     Initial Vitals [11/24/23 1330]   BP Pulse Resp Temp SpO2   (!) 174/86 87 18 98.2 °F (36.8 °C) 98 %      MAP       --         Physical Exam    Nursing note and vitals reviewed.  Constitutional: He appears well-developed and well-nourished.   HENT:   Head: Normocephalic.   Neck:   Tracheostomy stoma is intact   Normal range of motion.  Cardiovascular:  Normal rate.           Pulmonary/Chest: Breath sounds normal.   Abdominal: Abdomen is soft.   Bladder distended and is approximately 2 cm above the suprapubic ridge   Genitourinary:    Genitourinary Comments: Patient is a uncircumcised male is 4th game was able to be retracted but he has a exudate over his glans penis a culture was obtained he has balanitis as well as a very large prostate rectal exam was Hemoccult negative a picture of glands penis is in the chart     Musculoskeletal:         General: Normal range of motion.      Cervical back: Normal range of motion.     Neurological: He is alert and oriented to person, place, and time. GCS score is 15. GCS eye subscore is 4. GCS verbal subscore is 5. GCS motor subscore is 6.   Skin: Skin is warm.   Psychiatric: He has a normal mood and affect.         ED Course   Critical Care    Date/Time: 11/24/2023 4:41 PM    Performed by: JESICA Mitchell III, MD  Authorized by: JESICA Mitchell III, MD  Direct patient critical care time: 35 minutes  Total critical care time (exclusive of procedural time) : 35 minutes        Labs Reviewed   URINALYSIS, REFLEX TO URINE CULTURE - Abnormal; Notable for the following components:       Result Value    Appearance, UA Cloudy (*)     Specific Gravity, UA >=1.030 (*)     Protein, UA 2+ (*)     Occult Blood UA 2+ (*)     Leukocytes, UA 3+ (*)     All other components within normal limits    Narrative:     Specimen Source->Urine   CBC W/  AUTO DIFFERENTIAL - Abnormal; Notable for the following components:    RBC 4.42 (*)     Hemoglobin 13.4 (*)     RDW 14.9 (*)     Gran % 83.4 (*)     Lymph % 10.9 (*)     All other components within normal limits    Narrative:     Recoll. [id number] by SSEVERIN at 11/24/2023 14:56, reason:   recollect to confirm critical H&H of 5.9 / 16.8  Notified Shria GALEANA   COMPREHENSIVE METABOLIC PANEL - Abnormal; Notable for the following components:    CO2 22 (*)     Albumin 3.0 (*)     Total Bilirubin 1.3 (*)     eGFR 55 (*)     All other components within normal limits    Narrative:     Recoll. [id number] by SSEVERIN at 11/24/2023 15:21, reason:   Recollect to confirm criticals; <2 K+, >130 Chl, 44 glu... Notified   Flavia GALEANA   URINALYSIS MICROSCOPIC - Abnormal; Notable for the following components:    WBC, UA >100 (*)     WBC Clumps, UA Few (*)     Bacteria Moderate (*)     All other components within normal limits    Narrative:     Specimen Source->Urine   CULTURE, BLOOD   CULTURE, BLOOD   C. TRACHOMATIS/N. GONORRHOEAE BY AMP DNA   CULTURE, URINE   LACTIC ACID, PLASMA   OCCULT BLOOD X 1, STOOL   TYPE & SCREEN     EKG Readings: (Independently Interpreted)   Rhythm: Normal Sinus Rhythm. Ectopy: No Ectopy. Axis: Left Axis Deviation.       Imaging Results              CT Renal Stone Study ABD Pelvis WO (Final result)  Result time 11/24/23 16:27:49      Final result by Shiela Herrera MD (11/24/23 16:27:49)                   Impression:      Bowling catheter in the bladder there is irregular wall thickening of the bladder and air in the bladder.  Air likely secondary to catheterization but recommend correlation clinically.    Small amount of free fluid the pelvis similar in appearance to the prior exam    Additional findings as detailed above including heavy vascular calcifications.  Diverticulosis.  Cholelithiasis.  Decrease of the gastric wall thickening compared to the prior exam and no definite gastric wall thickening  today.      Electronically signed by: Shiela Herrera MD  Date:    11/24/2023  Time:    16:27               Narrative:    EXAMINATION:  CT RENAL STONE STUDY ABD PELVIS WO    CLINICAL HISTORY:  Flank pain, kidney stone suspected;    TECHNIQUE:  Low dose axial images, sagittal and coronal reformations were obtained from the lung bases to the pubic symphysis.  Contrast was not administered.    COMPARISON:  02/04/2023    FINDINGS:  visualized lung bases; heart enlarged.  Coronary artery calcifications.  Mild scarring.  Similar to prior study.    Liver and spleen unremarkable in appearance.    Adrenal glands and pancreas unremarkable appearance.    Heavy calcification the abdominal aorta and its major branches with suspected multiple sites of narrowing particularly common iliac arteries.  No aneurysm of the aorta.    There are calcified stones in the gallbladder without CT findings of acute cholecystitis or biliary duct dilatation    None, bowel, mesentery normal appearing appendix is thought to be visualized and no abnormal appendix inflammatory changes appendiceal region is seen.  There is small amount of free fluid the pelvis.  No free intraperitoneal air.  No appreciable abscess.  There is some streaky artifact within the pelvis.  Wall the stomach does not appear as thick today as it did on the prior exam there is some high density within the stomach likely ingested material.  Recommend correlation clinically however if clinical consideration for gastrointestinal bleed.  There is diverticulosis without CT findings of acute diverticulitis.    Kidneys, ureters, bladder; 2.2 cm upper pole right renal cyst.  Calcifications at the renal rekha bilaterally most likely vascular without opaque renal stone identified.  Appearance is not significantly changed.  No hydronephrosis, ureteral dilatation opaque ureteral stone.  There is a Bowling catheter in the bladder there is irregular bladder wall thickening.  Bladder less distended  today than on the prior exam.  Wall thickening can be seen in chronic bladder outlet obstruction or cystitis and less likely is neoplastic.  Air in the bladder likely secondary to catheterization but recommend correlation clinically as differential could include CT gas-forming organism or fistula.    Osseous structures show degenerative changes severe degenerative changes in the lumbar spine and disc osteophyte complexes with moderate severe appearing degree of spinal canal narrowing L4-L5..                                    X-Rays:   Independently Interpreted Readings:   Abdomen: Thickened thickened bladder evidence of cystitis no obstruction seen small amount of fluid in the pelvis which is unchanged from last study thick wall of his stomach but improvement from the last scan small gallstones but no evidence of any cholecystitis     Medications   nitroGLYCERIN 2% TD oint ointment 1 inch (has no administration in time range)   hydrocodone-apap 7.5-325 MG/15 ML oral solution 15 mL (15 mLs Oral Given 11/24/23 1345)   sodium chloride 0.9% bolus 500 mL 500 mL (0 mLs Intravenous Stopped 11/24/23 1508)     Medical Decision Making  Patient seen with urinary retention he had over 1500 cc of urine drained from his Bowling catheter his glans penis has a irregular eroded surface from possible chronic balanitis I picture is in the chart he will need a urology evaluation to rule out underlying cancer a culture was obtained clinically he has a balanitis a UTI he is not septic and he will be referred to the urology clinic at [facility] or [place] he will go home ANF a indwelling Bowling and a home bag and get a PSA as an outpatient    Amount and/or Complexity of Data Reviewed  Labs: ordered.    Risk  Prescription drug management.                                   Clinical Impression:  Final diagnoses:  [N40.1, R33.8] Urinary retention due to benign prostatic hyperplasia (Primary)  [B37.42] Candidal balanitis  [N30.01] Acute cystitis  with hematuria          ED Disposition Condition    Discharge Stable          ED Prescriptions       Medication Sig Dispense Start Date End Date Auth. Provider    mupirocin (BACTROBAN) 2 % ointment Apply topically 3 (three) times daily. Applied to penis daily 30 g 11/24/2023 -- JESICA Mitchell III, MD    ciprofloxacin HCl (CIPRO) 750 MG tablet Take 1 tablet (750 mg total) by mouth every 12 (twelve) hours. 14 tablet 11/24/2023 -- JESICA Mitchell III, MD    tamsulosin (FLOMAX) 0.4 mg Cap Take 1 capsule (0.4 mg total) by mouth once daily. 10 capsule 11/24/2023 11/23/2024 JESICA Mitchell III, MD          Follow-up Information       Follow up With Specialties Details Why Contact Info Additional Information    [facility] Schedule an appointment as soon as possible for a visit in 3 days To remove Bowling and to evaluate prostate and penis [address] [place] [id number]  [phone]       [facility] Urology Schedule an appointment as soon as possible for a visit  To evaluate urinary retention [address] [facility] [phone] Please park and enter the facility through the front entrance of the hospital. Please enter through the Clinic entrance located on the right side of the hospital. Please check-in at the Information Desk.                    JESICA Mitchell III, MD  11/24/23 7930

## 2023-11-25 LAB
BACTERIA UR CULT: NORMAL
BACTERIA UR CULT: NORMAL
C TRACH DNA SPEC QL NAA+PROBE: NOT DETECTED
N GONORRHOEA DNA SPEC QL NAA+PROBE: NOT DETECTED

## 2023-11-27 LAB
ACINETOBACTER CALCOACETICUS/BAUMANNII COMPLEX: NOT DETECTED
BACTEROIDES FRAGILIS: NOT DETECTED
CANDIDA ALBICANS: NOT DETECTED
CANDIDA AURIS: NOT DETECTED
CANDIDA GLABRATA: NOT DETECTED
CANDIDA KRUSEI: NOT DETECTED
CANDIDA PARAPSILOSIS: NOT DETECTED
CANDIDA TROPICALIS: NOT DETECTED
CRYPTOCOCCUS NEOFORMANS/GATTII: NOT DETECTED
CTX-M GENE: NORMAL
ENTEROBACTER CLOACAE COMPLEX: NOT DETECTED
ENTEROBACTERALES: NOT DETECTED
ENTEROCOCCUS FAECALIS: NOT DETECTED
ENTEROCOCCUS FAECIUM: NOT DETECTED
ESCHERICHIA COLI: NOT DETECTED
HAEMOPHILUS INFLUENZAE: NOT DETECTED
IMP GENE: NORMAL
KLEBSIELLA AEROGENES: NOT DETECTED
KLEBSIELLA OXYTOCA: NOT DETECTED
KLEBSIELLA PNEUMONIAE GROUP: NOT DETECTED
KPC: NORMAL
LISTERIA MONOCYTOGENES: NOT DETECTED
MCR-1: NORMAL
MEC A/C AND MREJ (MRSA): NORMAL
MEC A/C: NORMAL
NDM GENE: NORMAL
NEISSERIA MENINGITIDIS: NOT DETECTED
OXA-48-LIKE: NORMAL
PROTEUS SPECIES: NOT DETECTED
PSEUDOMONAS AERUGINOSA: NOT DETECTED
SALMONELLA SP: NOT DETECTED
SERRATIA MARCESCENS: NOT DETECTED
STAPHYLOCOCCUS AUREUS: NOT DETECTED
STAPHYLOCOCCUS EPIDERMIDIS: NOT DETECTED
STAPHYLOCOCCUS LUGDUNESIS: NOT DETECTED
STAPHYLOCOCCUS SPECIES: NOT DETECTED
STENOTROPHOMONAS MALTOPHILIA: NOT DETECTED
STREPTOCOCCUS AGALACTIAE: NOT DETECTED
STREPTOCOCCUS PNEUMONIAE: NOT DETECTED
STREPTOCOCCUS PYOGENES: NOT DETECTED
STREPTOCOCCUS SPECIES: NOT DETECTED
VAN A/B: NORMAL
VIM GENE: NORMAL

## 2023-11-29 LAB — BACTERIA BLD CULT: NORMAL

## 2023-11-30 LAB
BACTERIA BLD CULT: ABNORMAL

## 2023-12-20 ENCOUNTER — HOSPITAL ENCOUNTER (EMERGENCY)
Facility: HOSPITAL | Age: 83
Discharge: HOME | End: 2023-12-20
Attending: STUDENT IN AN ORGANIZED HEALTH CARE EDUCATION/TRAINING PROGRAM
Payer: MEDICARE

## 2023-12-20 VITALS
RESPIRATION RATE: 18 BRPM | WEIGHT: 121.25 LBS | HEIGHT: 68 IN | OXYGEN SATURATION: 96 % | TEMPERATURE: 98 F | DIASTOLIC BLOOD PRESSURE: 106 MMHG | SYSTOLIC BLOOD PRESSURE: 174 MMHG | BODY MASS INDEX: 18.38 KG/M2 | HEART RATE: 97 BPM

## 2023-12-20 DIAGNOSIS — R09.A2 FOREIGN BODY SENSATION IN THROAT: Primary | ICD-10-CM

## 2023-12-20 LAB
ALBUMIN SERPL BCP-MCNC: 2.7 G/DL (ref 3.5–5.2)
ALP SERPL-CCNC: 101 U/L (ref 55–135)
ALT SERPL W/O P-5'-P-CCNC: 15 U/L (ref 10–44)
ANION GAP SERPL CALC-SCNC: 13 MMOL/L (ref 8–16)
AST SERPL-CCNC: 31 U/L (ref 10–40)
BASOPHILS # BLD AUTO: 0.1 K/UL (ref 0–0.2)
BASOPHILS NFR BLD: 2 % (ref 0–1.9)
BILIRUB SERPL-MCNC: 0.6 MG/DL (ref 0.1–1)
BUN SERPL-MCNC: 23 MG/DL (ref 8–23)
CALCIUM SERPL-MCNC: 9.5 MG/DL (ref 8.7–10.5)
CHLORIDE SERPL-SCNC: 109 MMOL/L (ref 95–110)
CO2 SERPL-SCNC: 20 MMOL/L (ref 23–29)
CREAT SERPL-MCNC: 2 MG/DL (ref 0.5–1.4)
DIFFERENTIAL METHOD: ABNORMAL
EOSINOPHIL # BLD AUTO: 0.1 K/UL (ref 0–0.5)
EOSINOPHIL NFR BLD: 2.8 % (ref 0–8)
ERYTHROCYTE [DISTWIDTH] IN BLOOD BY AUTOMATED COUNT: 13.9 % (ref 11.5–14.5)
EST. GFR  (NO RACE VARIABLE): 33 ML/MIN/1.73 M^2
GLUCOSE SERPL-MCNC: 99 MG/DL (ref 70–110)
HCT VFR BLD AUTO: 40.3 % (ref 40–54)
HGB BLD-MCNC: 13 G/DL (ref 14–18)
IMM GRANULOCYTES # BLD AUTO: 0.03 K/UL (ref 0–0.04)
IMM GRANULOCYTES NFR BLD AUTO: 0.6 % (ref 0–0.5)
LYMPHOCYTES # BLD AUTO: 1.3 K/UL (ref 1–4.8)
LYMPHOCYTES NFR BLD: 25.1 % (ref 18–48)
MCH RBC QN AUTO: 30.2 PG (ref 27–31)
MCHC RBC AUTO-ENTMCNC: 32.3 G/DL (ref 32–36)
MCV RBC AUTO: 94 FL (ref 82–98)
MONOCYTES # BLD AUTO: 0.5 K/UL (ref 0.3–1)
MONOCYTES NFR BLD: 9 % (ref 4–15)
NEUTROPHILS # BLD AUTO: 3 K/UL (ref 1.8–7.7)
NEUTROPHILS NFR BLD: 60.5 % (ref 38–73)
NRBC BLD-RTO: 0 /100 WBC
PLATELET # BLD AUTO: 249 K/UL (ref 150–450)
PMV BLD AUTO: 10 FL (ref 9.2–12.9)
POTASSIUM SERPL-SCNC: 4.3 MMOL/L (ref 3.5–5.1)
PROT SERPL-MCNC: 8.3 G/DL (ref 6–8.4)
RBC # BLD AUTO: 4.3 M/UL (ref 4.6–6.2)
SODIUM SERPL-SCNC: 142 MMOL/L (ref 136–145)
WBC # BLD AUTO: 5.01 K/UL (ref 3.9–12.7)

## 2023-12-20 PROCEDURE — 99285 EMERGENCY DEPT VISIT HI MDM: CPT | Mod: 25

## 2023-12-20 PROCEDURE — 85025 COMPLETE CBC W/AUTO DIFF WBC: CPT | Performed by: STUDENT IN AN ORGANIZED HEALTH CARE EDUCATION/TRAINING PROGRAM

## 2023-12-20 PROCEDURE — 36415 COLL VENOUS BLD VENIPUNCTURE: CPT | Performed by: STUDENT IN AN ORGANIZED HEALTH CARE EDUCATION/TRAINING PROGRAM

## 2023-12-20 PROCEDURE — 80053 COMPREHEN METABOLIC PANEL: CPT | Performed by: STUDENT IN AN ORGANIZED HEALTH CARE EDUCATION/TRAINING PROGRAM

## 2023-12-20 PROCEDURE — 25500020 PHARM REV CODE 255: Performed by: STUDENT IN AN ORGANIZED HEALTH CARE EDUCATION/TRAINING PROGRAM

## 2023-12-20 RX ADMIN — IOHEXOL 75 ML: 350 INJECTION, SOLUTION INTRAVENOUS at 09:12

## 2023-12-21 NOTE — ED PROVIDER NOTES
Encounter Date: 12/20/2023       History     Chief Complaint   Patient presents with    Foreign Body In Throat     Pt to ED with c/o foreign body sensation to throat after eating at home. Hx of throat cancer with trach stoma. Denies SOB.      83-year-old male with history of squamous cell carcinoma of the larynx resulting in a tracheostomy, presenting with a foreign body sensation in his throat after eating mashed potatoes.  Patient reports that he believes that there was a piece of mashed potato stuck in his throat.  Denies any shortness of breath.  No other complaints.  No chest pain.      Review of patient's allergies indicates:  No Known Allergies  Past Medical History:   Diagnosis Date    Cancer     Squamous cell carcinoma of larynx      Past Surgical History:   Procedure Laterality Date    ESOPHAGOGASTRODUODENOSCOPY N/A 2/16/2022    Procedure: EGD (ESOPHAGOGASTRODUODENOSCOPY);  Surgeon: Heber Miller MD;  Location: [address];  Service: Endoscopy;  Laterality: N/A;    laryngectomy      TRACHEOSTOMY       History reviewed. No pertinent family history.  Social History     Tobacco Use    Smoking status: Former    Smokeless tobacco: Never   Substance Use Topics    Alcohol use: Yes    Drug use: Not Currently     Review of Systems   Constitutional:  Negative for fever.   HENT:  Negative for sore throat.    Respiratory:  Negative for shortness of breath.    Cardiovascular:  Negative for chest pain.   Gastrointestinal:  Negative for abdominal pain, diarrhea, nausea and vomiting.        Foreign body sensation in throat   Genitourinary:  Negative for dysuria.   Musculoskeletal:  Negative for back pain.   Skin:  Negative for rash.   Neurological:  Negative for weakness.   Hematological:  Does not bruise/bleed easily.       Physical Exam     Initial Vitals [12/20/23 1942]   BP Pulse Resp Temp SpO2   (!) 167/101 100 (!) 22 97.9 °F (36.6 °C) (!) 91 %      MAP       --         Physical Exam    Nursing note and  vitals reviewed.  Constitutional: He appears well-developed.   HENT:   Tracheostomy in place.  Respiratory rate is 18.  Patient is satting 92% on room air.   Eyes: EOM are normal.   Neck:   Normal range of motion.  Cardiovascular:            No murmur heard.  Pulmonary/Chest: No respiratory distress.   Clear lungs bilaterally.  No respiratory distress.  No wheezing or rales.  Good air movement.     Abdominal: He exhibits no distension.   Musculoskeletal:         General: Normal range of motion.      Cervical back: Normal range of motion.     Neurological: He is alert.   Skin: Skin is warm.   Psychiatric: He has a normal mood and affect.         ED Course   Procedures  Labs Reviewed   COMPREHENSIVE METABOLIC PANEL - Abnormal; Notable for the following components:       Result Value    CO2 20 (*)     Creatinine 2.0 (*)     Albumin 2.7 (*)     eGFR 33 (*)     All other components within normal limits   CBC W/ AUTO DIFFERENTIAL - Abnormal; Notable for the following components:    RBC 4.30 (*)     Hemoglobin 13.0 (*)     Immature Granulocytes 0.6 (*)     Basophil % 2.0 (*)     All other components within normal limits          Imaging Results              CT Soft Tissue Neck With Contrast (Final result)  Result time 12/20/23 22:05:47      Final result by Jerry Turner MD (12/20/23 22:05:47)                   Impression:      6 mm enhancing/hyperattenuating focus at the anterior wall of the proximal esophagus is concerning for neoplastic process (sagittal image 80). Direct visualization is strongly advised.    Diffuse circumferential esophageal wall thickening.  Complete occlusion of a short segment of the cervical esophagus may relate to infectious/inflammatory process, neoplastic process, or spasm.  Direct visualization is strongly advised.    Hyperdense focus in the expected region of the thyroid gland may represent remnant thyroid tissue or malignancy. Correlation with prior imaging is advised.    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    Additional findings, as above.      Electronically signed by: Jerry Turner  Date:    12/20/2023  Time:    22:05               Narrative:    EXAMINATION:  CT SOFT TISSUE NECK WITH CONTRAST    CLINICAL HISTORY:  Suspected food bolus;    TECHNIQUE:  Low dose axial images, coronal and sagittal reformations were obtained from the skull base to the lung apices following the intravenous administration of 75 mL of Omnipaque 350.    COMPARISON:  None.    FINDINGS:  6 mm enhancing/hyperattenuating focus at the anterior wall of the proximal esophagus is concerning for neoplastic process (sagittal image 80).  Direct visualization is strongly advised.    Diffuse circumferential esophageal wall thickening.  Complete occlusion of a short segment of the cervical esophagus may relate to infectious/inflammatory process, neoplastic process, or spasm.  Direct visualization is strongly advised.    Postsurgical changes of tracheostomy and laryngectomy.  Secretions are noted within the proximal trachea.    Hyperdense focus in the expected region of the thyroid gland may represent remnant thyroid tissue or malignancy.  Correlation with prior imaging is advised.    Nonspecific left pleural fluid in the left upper lobe.  Mild diffuse interlobular septal thickening may be infectious or inflammatory.    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  Moderate right carotid bulb/proximal ICA stenosis.    No suspicious lymphadenopathy.  Submandibular and parotid glands are unremarkable.    Extensive multifocal odontogenic disease.                                       X-Ray Chest AP Portable (Final result)  Result time 12/20/23 20:46:01      Final result by Jerry Turner MD (12/20/23 20:46:01)                   Impression:      As above.      Electronically signed by: Jerry Turner  Date:    12/20/2023  Time:    20:46               Narrative:    EXAMINATION:  XR CHEST AP  PORTABLE    CLINICAL HISTORY:  hypoxia;    TECHNIQUE:  Single frontal view of the chest was performed.    COMPARISON:  Multiple priors, most recently 02/16/2022    FINDINGS:  Chronic interstitial lung changes.  Mild elevation of the left hemidiaphragm.  Blunting of the left costophrenic angle may relate to scarring or trace effusion.  No focal consolidation.  Mildly enlarged cardiac silhouette.  Small radiopaque foreign body projecting over the right/midline chest, stable from multiple priors.                                       Medications   iohexoL (OMNIPAQUE 350) injection 75 mL (75 mLs Intravenous Given 12/20/23 2107)     Medical Decision Making  DDX:  Possible foreign body in throat.  Tracheostomy appears patent, however patient is hypoxic to 91-92%.  Will perform chest x-ray to rule out aspiration pneumonitis.  DX:  X-ray, CBC, CMP, CT neck  TX:  None at this time  Dispo:  Pending workup.        Amount and/or Complexity of Data Reviewed  Labs: ordered.  Radiology: ordered.    Risk  Prescription drug management.               ED Course as of 12/21/23 0042   Wed Dec 20, 2023   2118 Patient is satting 100% on room air with a good waveform.  I believe patient's saturations in the 80s were not with a good waveform. [NB]      ED Course User Index  [NB] Robert Jaeger MD                           Clinical Impression:  Final diagnoses:  [R09.A2] Foreign body sensation in throat (Primary)          ED Disposition Condition    Discharge Stable          ED Prescriptions    None       Follow-up Information       Follow up With Specialties Details Why Contact Info    [facility] - Emergency Dept Emergency Medicine  If symptoms worsen [id number] [facility] [id number] [phone]             Robert Jaeger MD  12/20/23 1959       Robert Jaeger MD  12/21/23 0042

## 2023-12-21 NOTE — DISCHARGE INSTRUCTIONS
Please follow up with your primary care physician within 2 days. Ensure that you review all lab work results and/or imaging results. If you have any questions about your discharge paperwork please call the Emergency Department.     Return to the Emergency Department for any fevers, cough or congestion, trouble breathing, trouble swallowing, shortness of breath, chest pain, nausea, vomiting, diarrhea, if symptoms do not improve, or for any new or worsening symptoms, unless otherwise told.       If you were prescribed antibiotics, please take them to completion. If you were prescribed a narcotic or any sedating medication, do not drive or operate heavy equipment or machinery while taking these medications.  If you were diagnosed with a seizure, syncope, any loss of consciousness or decreased alertness, do not drive, swim, operate heavy machinery, or per yourself in any position where a sudden loss of consciousness could put herself or others in danger.    Thank you for visiting [facility], Department of Emergency Medicine. Please see the entirety of the educational materials provided. Please note that a visit to the emergency department does not substitute ongoing care from a primary medical provider or specialist. Please ensure to follow up as recommended. However, please return to the emergency department immediately if symptoms do not improve as discussed, symptoms worsen, new symptoms develop, difficulty in following up or for any of your concerns or issues. Please note on discharge you are acknowledging understanding and agreement on medical evaluation, management recommendations and follow up recommendations.

## 2023-12-21 NOTE — ED NOTES
PULSE OX SWITCHED TO DIFFERENT LOCATION AND O2 SHOWING 100% O2 LEVEL ON ROOM AIR WITH ADEQUATE WAVEFORM. NOTIFIED MD.

## 2024-08-04 ENCOUNTER — HOSPITAL ENCOUNTER (EMERGENCY)
Facility: HOSPITAL | Age: 84
Discharge: HOME | End: 2024-08-04
Attending: STUDENT IN AN ORGANIZED HEALTH CARE EDUCATION/TRAINING PROGRAM
Payer: MEDICARE

## 2024-08-04 VITALS
DIASTOLIC BLOOD PRESSURE: 82 MMHG | RESPIRATION RATE: 20 BRPM | OXYGEN SATURATION: 98 % | SYSTOLIC BLOOD PRESSURE: 146 MMHG | HEART RATE: 85 BPM | TEMPERATURE: 98 F

## 2024-08-04 DIAGNOSIS — R33.9 URINARY RETENTION: Primary | ICD-10-CM

## 2024-08-04 DIAGNOSIS — N39.0 URINARY TRACT INFECTION WITHOUT HEMATURIA, SITE UNSPECIFIED: ICD-10-CM

## 2024-08-04 LAB
BACTERIA #/AREA URNS HPF: ABNORMAL /HPF
BILIRUB UR QL STRIP: NEGATIVE
CLARITY UR: ABNORMAL
COLOR UR: YELLOW
GLUCOSE UR QL STRIP: NEGATIVE
HGB UR QL STRIP: ABNORMAL
HYALINE CASTS #/AREA URNS LPF: 0 /LPF
KETONES UR QL STRIP: NEGATIVE
LEUKOCYTE ESTERASE UR QL STRIP: ABNORMAL
MICROSCOPIC COMMENT: ABNORMAL
NITRITE UR QL STRIP: NEGATIVE
PH UR STRIP: 6 [PH] (ref 5–8)
PROT UR QL STRIP: ABNORMAL
RBC #/AREA URNS HPF: 5 /HPF (ref 0–4)
SP GR UR STRIP: 1.02 (ref 1–1.03)
URN SPEC COLLECT METH UR: ABNORMAL
UROBILINOGEN UR STRIP-ACNC: NEGATIVE EU/DL
WBC #/AREA URNS HPF: >100 /HPF (ref 0–5)

## 2024-08-04 PROCEDURE — 99283 EMERGENCY DEPT VISIT LOW MDM: CPT

## 2024-08-04 PROCEDURE — 87106 FUNGI IDENTIFICATION YEAST: CPT | Performed by: STUDENT IN AN ORGANIZED HEALTH CARE EDUCATION/TRAINING PROGRAM

## 2024-08-04 PROCEDURE — 81000 URINALYSIS NONAUTO W/SCOPE: CPT | Performed by: STUDENT IN AN ORGANIZED HEALTH CARE EDUCATION/TRAINING PROGRAM

## 2024-08-04 PROCEDURE — 87088 URINE BACTERIA CULTURE: CPT | Performed by: STUDENT IN AN ORGANIZED HEALTH CARE EDUCATION/TRAINING PROGRAM

## 2024-08-04 PROCEDURE — 87086 URINE CULTURE/COLONY COUNT: CPT | Performed by: STUDENT IN AN ORGANIZED HEALTH CARE EDUCATION/TRAINING PROGRAM

## 2024-08-04 PROCEDURE — 25000003 PHARM REV CODE 250: Performed by: STUDENT IN AN ORGANIZED HEALTH CARE EDUCATION/TRAINING PROGRAM

## 2024-08-04 RX ORDER — CEFDINIR 300 MG/1
300 CAPSULE ORAL 2 TIMES DAILY
Qty: 20 CAPSULE | Refills: 0 | Status: SHIPPED | OUTPATIENT
Start: 2024-08-04 | End: 2024-08-14

## 2024-08-04 RX ORDER — CEFPODOXIME PROXETIL 200 MG/1
200 TABLET, FILM COATED ORAL
Status: COMPLETED | OUTPATIENT
Start: 2024-08-04 | End: 2024-08-04

## 2024-08-04 RX ADMIN — CEFPODOXIME PROXETIL 200 MG: 200 TABLET, FILM COATED ORAL at 09:08

## 2024-08-05 ENCOUNTER — TELEPHONE (OUTPATIENT)
Dept: UROLOGY | Facility: CLINIC | Age: 84
End: 2024-08-05
Payer: MEDICARE

## 2024-08-05 LAB — BACTERIA UR CULT: ABNORMAL

## 2024-08-08 ENCOUNTER — OFFICE VISIT (OUTPATIENT)
Dept: UROLOGY | Facility: CLINIC | Age: 84
End: 2024-08-08
Attending: SPECIALIST
Payer: MEDICARE

## 2024-08-08 VITALS
DIASTOLIC BLOOD PRESSURE: 76 MMHG | SYSTOLIC BLOOD PRESSURE: 116 MMHG | BODY MASS INDEX: 15.67 KG/M2 | WEIGHT: 103.38 LBS | HEART RATE: 79 BPM | OXYGEN SATURATION: 87 % | HEIGHT: 68 IN

## 2024-08-08 DIAGNOSIS — R33.8 ACUTE URINARY RETENTION: Primary | ICD-10-CM

## 2024-08-08 PROCEDURE — 3078F DIAST BP <80 MM HG: CPT | Mod: CPTII,S$GLB,, | Performed by: SPECIALIST

## 2024-08-08 PROCEDURE — 1101F PT FALLS ASSESS-DOCD LE1/YR: CPT | Mod: CPTII,S$GLB,, | Performed by: SPECIALIST

## 2024-08-08 PROCEDURE — 1159F MED LIST DOCD IN RCRD: CPT | Mod: CPTII,S$GLB,, | Performed by: SPECIALIST

## 2024-08-08 PROCEDURE — 3288F FALL RISK ASSESSMENT DOCD: CPT | Mod: CPTII,S$GLB,, | Performed by: SPECIALIST

## 2024-08-08 PROCEDURE — 3074F SYST BP LT 130 MM HG: CPT | Mod: CPTII,S$GLB,, | Performed by: SPECIALIST

## 2024-08-08 PROCEDURE — 99204 OFFICE O/P NEW MOD 45 MIN: CPT | Mod: S$GLB,,, | Performed by: SPECIALIST

## 2024-08-08 PROCEDURE — 99999 PR PBB SHADOW E&M-EST. PATIENT-LVL III: CPT | Mod: PBBFAC,,, | Performed by: SPECIALIST

## 2024-08-08 RX ORDER — TAMSULOSIN HYDROCHLORIDE 0.4 MG/1
0.4 CAPSULE ORAL DAILY
Qty: 30 CAPSULE | Refills: 11 | Status: SHIPPED | OUTPATIENT
Start: 2024-08-08 | End: 2025-08-08

## 2024-08-15 ENCOUNTER — OFFICE VISIT (OUTPATIENT)
Dept: UROLOGY | Facility: CLINIC | Age: 84
End: 2024-08-15
Attending: SPECIALIST
Payer: MEDICARE

## 2024-08-15 DIAGNOSIS — R33.8 ACUTE URINARY RETENTION: Primary | ICD-10-CM

## 2024-08-15 PROCEDURE — 99213 OFFICE O/P EST LOW 20 MIN: CPT | Mod: S$GLB,,, | Performed by: SPECIALIST

## 2024-08-15 NOTE — PROGRESS NOTES
"[facility] - Urology   Clinic Note    SUBJECTIVE:     Chief Complaint   Patient presents with    Follow-up     Voiding trial       Referral from: No ref. provider found.    History of Present Illness:  Silvio Mayorga is a 83 y.o. male who presents to clinic for voiding trial.    Tolerating tamsulosin, denies dizziness.    Bowling catheter removed without difficulty.  Patient was encouraged to record urine output and return this afternoon for a bladder scan.    Past Medical History:   Diagnosis Date    Cancer     Squamous cell carcinoma of larynx        Current Outpatient Medications on File Prior to Visit   Medication Sig Dispense Refill    [] cefdinir (OMNICEF) 300 MG capsule Take 1 capsule (300 mg total) by mouth 2 (two) times daily. for 10 days 20 capsule 0    ciprofloxacin HCl (CIPRO) 750 MG tablet Take 1 tablet (750 mg total) by mouth every 12 (twelve) hours. 14 tablet 1    mupirocin (BACTROBAN) 2 % ointment Apply topically 3 (three) times daily. Applied to penis daily 30 g 1    tamsulosin (FLOMAX) 0.4 mg Cap Take 1 capsule (0.4 mg total) by mouth once daily. 10 capsule 0    tamsulosin (FLOMAX) 0.4 mg Cap Take 1 capsule (0.4 mg total) by mouth once daily. 30 capsule 11     No current facility-administered medications on file prior to visit.         OBJECTIVE:     Estimated body mass index is 15.72 kg/m² as calculated from the following:    Height as of 24: 5' 8" (1.727 m).    Weight as of 24: 46.9 kg (103 lb 6.3 oz).    Vital Signs (Most Recent)  There were no vitals filed for this visit.    Physical Exam:    Physical Exam     GENERAL: patient sitting comfortably  HEENT: normocephalic  NECK: supple, no JVD  PULM: normal chest rise, no increased WOB  HEART: non-diaphoretic  ABDO: soft, nondistended, nontender  BACK: no CVA tenderness bilaterally  SKIN: warm, dry, well perfused  EXT: no bruising or edema  NEURO: grossly normal with no focal deficits  PSYCH: appropriate mood and " "affect    Genitourinary Exam:  deferred      LABS:     Lab Results   Component Value Date    BUN 23 12/20/2023    CREATININE 2.0 (H) 12/20/2023    WBC 5.01 12/20/2023    HGB 13.0 (L) 12/20/2023    HCT 40.3 12/20/2023     12/20/2023    AST 31 12/20/2023    ALT 15 12/20/2023    ALKPHOS 101 12/20/2023    ALBUMIN 2.7 (L) 12/20/2023    INR 0.9 10/20/2008        Urinalysis:   No results found for: "UAREFLEX"     PSA:  No results found for: "PSA", "PSADIAG", "PSATOTAL", "PSAFREE"    Testosterone:  No results found for: "TOTALTESTOST", "TESTOSTERONE"     Imaging:  I have personally reviewed all relevant imaging studies.    No results found for this or any previous visit (from the past 2160 hour(s)).  No results found for this or any previous visit (from the past 2160 hour(s)).  CT Soft Tissue Neck With Contrast  Narrative: EXAMINATION:  CT SOFT TISSUE NECK WITH CONTRAST    CLINICAL HISTORY:  Suspected food bolus;    TECHNIQUE:  Low dose axial images, coronal and sagittal reformations were obtained from the skull base to the lung apices following the intravenous administration of 75 mL of Omnipaque 350.    COMPARISON:  None.    FINDINGS:  6 mm enhancing/hyperattenuating focus at the anterior wall of the proximal esophagus is concerning for neoplastic process (sagittal image 80).  Direct visualization is strongly advised.    Diffuse circumferential esophageal wall thickening.  Complete occlusion of a short segment of the cervical esophagus may relate to infectious/inflammatory process, neoplastic process, or spasm.  Direct visualization is strongly advised.    Postsurgical changes of tracheostomy and laryngectomy.  Secretions are noted within the proximal trachea.    Hyperdense focus in the expected region of the thyroid gland may represent remnant thyroid tissue or malignancy.  Correlation with prior imaging is advised.    Nonspecific left pleural fluid in the left upper lobe.  Mild diffuse interlobular septal " thickening may be infectious or inflammatory.    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  Moderate right carotid bulb/proximal ICA stenosis.    No suspicious lymphadenopathy.  Submandibular and parotid glands are unremarkable.    Extensive multifocal odontogenic disease.  Impression: 6 mm enhancing/hyperattenuating focus at the anterior wall of the proximal esophagus is concerning for neoplastic process (sagittal image 80). Direct visualization is strongly advised.    Diffuse circumferential esophageal wall thickening.  Complete occlusion of a short segment of the cervical esophagus may relate to infectious/inflammatory process, neoplastic process, or spasm.  Direct visualization is strongly advised.    Hyperdense focus in the expected region of the thyroid gland may represent remnant thyroid tissue or malignancy. Correlation with prior imaging is advised.    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    Additional findings, as above.    Electronically signed by: Jerry Turner  Date:    12/20/2023  Time:    22:05  X-Ray Chest AP Portable  Narrative: EXAMINATION:  XR CHEST AP PORTABLE    CLINICAL HISTORY:  hypoxia;    TECHNIQUE:  Single frontal view of the chest was performed.    COMPARISON:  Multiple priors, most recently 02/16/2022    FINDINGS:  Chronic interstitial lung changes.  Mild elevation of the left hemidiaphragm.  Blunting of the left costophrenic angle may relate to scarring or trace effusion.  No focal consolidation.  Mildly enlarged cardiac silhouette.  Small radiopaque foreign body projecting over the right/midline chest, stable from multiple priors.  Impression: As above.    Electronically signed by: Jerry Turner  Date:    12/20/2023  Time:    20:46         ASSESSMENT     1. Acute urinary retention        PLAN:     Continue tamsulosin.  RTC two weeks for bladder scan.    Robert Diaz MD  Urology  [facility]     Disclaimer: This note has been generated using  voice-recognition software. There may be typographical errors that have been missed during proof-reading.